# Patient Record
Sex: FEMALE | Race: WHITE | NOT HISPANIC OR LATINO | Employment: FULL TIME | URBAN - NONMETROPOLITAN AREA
[De-identification: names, ages, dates, MRNs, and addresses within clinical notes are randomized per-mention and may not be internally consistent; named-entity substitution may affect disease eponyms.]

---

## 2022-03-25 ENCOUNTER — OFFICE VISIT (OUTPATIENT)
Dept: FAMILY MEDICINE | Facility: CLINIC | Age: 72
End: 2022-03-25
Payer: MEDICARE

## 2022-03-25 VITALS
OXYGEN SATURATION: 99 % | BODY MASS INDEX: 25.52 KG/M2 | DIASTOLIC BLOOD PRESSURE: 102 MMHG | RESPIRATION RATE: 16 BRPM | TEMPERATURE: 98 F | HEIGHT: 63 IN | HEART RATE: 113 BPM | WEIGHT: 144 LBS | SYSTOLIC BLOOD PRESSURE: 158 MMHG

## 2022-03-25 DIAGNOSIS — L98.9 SKIN LESION: ICD-10-CM

## 2022-03-25 DIAGNOSIS — I48.11 LONGSTANDING PERSISTENT ATRIAL FIBRILLATION: Primary | ICD-10-CM

## 2022-03-25 DIAGNOSIS — I42.0 NONISCHEMIC CONGESTIVE CARDIOMYOPATHY: ICD-10-CM

## 2022-03-25 DIAGNOSIS — Z12.31 SCREENING MAMMOGRAM, ENCOUNTER FOR: ICD-10-CM

## 2022-03-25 DIAGNOSIS — I10 ESSENTIAL HYPERTENSION, BENIGN: ICD-10-CM

## 2022-03-25 DIAGNOSIS — Z78.0 ASYMPTOMATIC POSTMENOPAUSAL STATUS: ICD-10-CM

## 2022-03-25 LAB
ANION GAP SERPL CALCULATED.3IONS-SCNC: 9 MMOL/L (ref 7–16)
BASOPHILS # BLD AUTO: 0.07 K/UL (ref 0–0.2)
BASOPHILS NFR BLD AUTO: 0.9 % (ref 0–1)
BUN SERPL-MCNC: 11 MG/DL (ref 7–18)
BUN/CREAT SERPL: 14 (ref 6–20)
CALCIUM SERPL-MCNC: 8.7 MG/DL (ref 8.5–10.1)
CHLORIDE SERPL-SCNC: 108 MMOL/L (ref 98–107)
CO2 SERPL-SCNC: 28 MMOL/L (ref 21–32)
CREAT SERPL-MCNC: 0.76 MG/DL (ref 0.55–1.02)
DIFFERENTIAL METHOD BLD: ABNORMAL
EOSINOPHIL # BLD AUTO: 0.08 K/UL (ref 0–0.5)
EOSINOPHIL NFR BLD AUTO: 1 % (ref 1–4)
ERYTHROCYTE [DISTWIDTH] IN BLOOD BY AUTOMATED COUNT: 14.7 % (ref 11.5–14.5)
GLUCOSE SERPL-MCNC: 115 MG/DL (ref 74–106)
HCT VFR BLD AUTO: 45.3 % (ref 38–47)
HGB BLD-MCNC: 14 G/DL (ref 12–16)
IMM GRANULOCYTES # BLD AUTO: 0.02 K/UL (ref 0–0.04)
IMM GRANULOCYTES NFR BLD: 0.3 % (ref 0–0.4)
LYMPHOCYTES # BLD AUTO: 1.27 K/UL (ref 1–4.8)
LYMPHOCYTES NFR BLD AUTO: 16.1 % (ref 27–41)
MCH RBC QN AUTO: 31.9 PG (ref 27–31)
MCHC RBC AUTO-ENTMCNC: 30.9 G/DL (ref 32–36)
MCV RBC AUTO: 103.2 FL (ref 80–96)
MONOCYTES # BLD AUTO: 0.75 K/UL (ref 0–0.8)
MONOCYTES NFR BLD AUTO: 9.5 % (ref 2–6)
MPC BLD CALC-MCNC: 13.1 FL (ref 9.4–12.4)
NEUTROPHILS # BLD AUTO: 5.68 K/UL (ref 1.8–7.7)
NEUTROPHILS NFR BLD AUTO: 72.2 % (ref 53–65)
NRBC # BLD AUTO: 0 X10E3/UL
NRBC, AUTO (.00): 0 %
PLATELET # BLD AUTO: 202 K/UL (ref 150–400)
PLATELET MORPHOLOGY: ABNORMAL
POTASSIUM SERPL-SCNC: 4.7 MMOL/L (ref 3.5–5.1)
RBC # BLD AUTO: 4.39 M/UL (ref 4.2–5.4)
RBC MORPH BLD: NORMAL
SODIUM SERPL-SCNC: 140 MMOL/L (ref 136–145)
WBC # BLD AUTO: 7.87 K/UL (ref 4.5–11)

## 2022-03-25 PROCEDURE — 85610 PROTHROMBIN TIME: CPT | Mod: ,,, | Performed by: CLINICAL MEDICAL LABORATORY

## 2022-03-25 PROCEDURE — 85025 COMPLETE CBC W/AUTO DIFF WBC: CPT | Mod: ,,, | Performed by: CLINICAL MEDICAL LABORATORY

## 2022-03-25 PROCEDURE — 93010 PR ELECTROCARDIOGRAM REPORT: ICD-10-PCS | Mod: ,,, | Performed by: INTERNAL MEDICINE

## 2022-03-25 PROCEDURE — 80048 BASIC METABOLIC PNL TOTAL CA: CPT | Mod: ,,, | Performed by: CLINICAL MEDICAL LABORATORY

## 2022-03-25 PROCEDURE — 85610 PROTIME-INR: ICD-10-PCS | Mod: ,,, | Performed by: CLINICAL MEDICAL LABORATORY

## 2022-03-25 PROCEDURE — 85025 CBC WITH DIFFERENTIAL: ICD-10-PCS | Mod: ,,, | Performed by: CLINICAL MEDICAL LABORATORY

## 2022-03-25 PROCEDURE — 93010 ELECTROCARDIOGRAM REPORT: CPT | Mod: ,,, | Performed by: INTERNAL MEDICINE

## 2022-03-25 PROCEDURE — 80048 BASIC METABOLIC PANEL: ICD-10-PCS | Mod: ,,, | Performed by: CLINICAL MEDICAL LABORATORY

## 2022-03-25 PROCEDURE — 99204 OFFICE O/P NEW MOD 45 MIN: CPT | Mod: ,,, | Performed by: INTERNAL MEDICINE

## 2022-03-25 PROCEDURE — 99204 PR OFFICE/OUTPT VISIT, NEW, LEVL IV, 45-59 MIN: ICD-10-PCS | Mod: ,,, | Performed by: INTERNAL MEDICINE

## 2022-03-25 PROCEDURE — 93005 ELECTROCARDIOGRAM TRACING: CPT | Mod: RHCUB | Performed by: INTERNAL MEDICINE

## 2022-03-25 RX ORDER — WARFARIN 7.5 MG/1
7.5 TABLET ORAL DAILY
COMMUNITY
Start: 2022-03-17 | End: 2023-11-17

## 2022-03-25 NOTE — PROGRESS NOTES
New Clinic Note    Patient Name:  Khalida Leos is a 71 y.o. female     Chief Complaint:    Chief Complaint   Patient presents with    Establish Care     Patient is here to establish PCP. She does not have a former PCP. She sees Dr. Urszula Whitman and is on coumadin for mechanical valve replacement. That was performed approximately 3 years ago.     Did not bring meds    Skin Problem     She has an area of discoloration to the skin of her left upper arm. She states it's been this way for ten years. Sometimes it itches. She's never seen a dermatologist.         Subjective  HPI         Current Outpatient Medications:     warfarin (COUMADIN) 7.5 MG tablet, Take 7.5 mg by mouth Daily., Disp: , Rfl:    Past Medical History:   Diagnosis Date    History of mitral valve replacement       Past Surgical History:   Procedure Laterality Date    MITRAL VALVE REPLACEMENT        Family History   Problem Relation Age of Onset    Heart disease Mother     Cancer Father       Social History     Tobacco Use    Smoking status: Current Every Day Smoker     Types: Cigarettes    Smokeless tobacco: Never Used   Substance Use Topics    Alcohol use: Never        Review of Systems   Constitutional: Negative for fatigue and fever.   HENT: Negative for nasal congestion, rhinorrhea and sore throat.    Eyes: Negative for visual disturbance.   Respiratory: Negative for cough, chest tightness, shortness of breath and wheezing.    Cardiovascular: Negative for chest pain, palpitations and leg swelling.   Gastrointestinal: Negative for abdominal pain, blood in stool, nausea and vomiting.   Genitourinary: Negative for dysuria and hematuria.   Musculoskeletal: Negative for back pain and neck pain.   Integumentary:  Negative for rash and mole/lesion.   Neurological: Negative for dizziness, vertigo, headaches and memory loss.   Hematological: Negative for adenopathy.   Psychiatric/Behavioral: Negative for confusion. The patient is not  "nervous/anxious.         Objective:  BP (!) 158/102 (BP Location: Left arm, Patient Position: Sitting)   Pulse (!) 113   Temp 97.8 °F (36.6 °C) (Temporal)   Resp 16   Ht 5' 3" (1.6 m)   Wt 65.3 kg (144 lb)   SpO2 99%   BMI 25.51 kg/m²      Physical Exam  Constitutional:       Appearance: Normal appearance.   HENT:      Head: Normocephalic and atraumatic.      Right Ear: External ear normal.      Left Ear: External ear normal.      Nose: Nose normal.   Eyes:      Extraocular Movements: Extraocular movements intact.      Conjunctiva/sclera: Conjunctivae normal.      Pupils: Pupils are equal, round, and reactive to light.   Cardiovascular:      Rate and Rhythm: Normal rate. Rhythm irregular.      Pulses: Normal pulses.      Heart sounds: Normal heart sounds. No murmur heard.    No friction rub. No gallop.   Pulmonary:      Effort: Pulmonary effort is normal.      Breath sounds: No wheezing, rhonchi or rales.   Abdominal:      General: Bowel sounds are normal.      Palpations: There is no mass.      Tenderness: There is no abdominal tenderness.   Musculoskeletal:         General: No swelling.      Cervical back: Normal range of motion and neck supple.   Skin:     General: Skin is warm.      Findings: Lesion (3 cm lesion to left upper outer arm) present. No rash.   Neurological:      General: No focal deficit present.      Mental Status: She is alert and oriented to person, place, and time.   Psychiatric:         Mood and Affect: Mood normal.          Assessment and Plan    Longstanding persistent atrial fibrillation  -     CBC Auto Differential; Future; Expected date: 03/25/2022  -     Protime-INR; Future; Expected date: 03/25/2022  -     POCT EKG 12-LEAD (NOT FOR [facility] USE)    Essential hypertension, benign  -     Basic Metabolic Panel; Future; Expected date: 03/25/2022    Nonischemic congestive cardiomyopathy    Screening mammogram, encounter for  -     Mammo Digital Screening Bilat; Future; Expected date: " "03/25/2022    Asymptomatic postmenopausal status  -     DXA Bone Density Spine And Hip; Future; Expected date: 03/25/2022    Skin lesion  -     Ambulatory referral/consult to Dermatology; Future; Expected date: 04/01/2022         Problem List Items Addressed This Visit        Cardiac/Vascular    Nonischemic congestive cardiomyopathy    Essential hypertension, benign    Relevant Orders    Basic Metabolic Panel    Longstanding persistent atrial fibrillation - Primary    Relevant Orders    CBC Auto Differential    Protime-INR    POCT EKG 12-LEAD (NOT FOR [facility] USE)      Other Visit Diagnoses     Screening mammogram, encounter for        Relevant Orders    Mammo Digital Screening Bilat    Asymptomatic postmenopausal status        Relevant Orders    DXA Bone Density Spine And Hip    Skin lesion        Relevant Orders    Ambulatory referral/consult to Dermatology       1-EKG shows afib with occassional PVC-get [facility] records, check INR  2-HTN and CHF per old chart-did not bring meds and states she does not take "heart pill" every day, only if she feels as if she needs it, stress the importance of taking this daily  3-MMG/DEXA  4-refer to Derm for skin lesion to left upper arm ?large SK?  Discuss c-scope-never had one, will bring this up again in the future.  We will help manage INR on her    Follow up in about 6 months (around 9/25/2022).     "

## 2022-03-28 LAB
INR BLD: 2.71 (ref 0.9–1.1)
PROTHROMBIN TIME: 28.1 SECONDS (ref 11.7–14.7)

## 2022-04-22 ENCOUNTER — OFFICE VISIT (OUTPATIENT)
Dept: FAMILY MEDICINE | Facility: CLINIC | Age: 72
End: 2022-04-22
Payer: MEDICARE

## 2022-04-22 VITALS
HEART RATE: 128 BPM | BODY MASS INDEX: 24.16 KG/M2 | OXYGEN SATURATION: 98 % | WEIGHT: 136.38 LBS | HEIGHT: 63 IN | TEMPERATURE: 98 F | RESPIRATION RATE: 16 BRPM | DIASTOLIC BLOOD PRESSURE: 106 MMHG | SYSTOLIC BLOOD PRESSURE: 190 MMHG

## 2022-04-22 DIAGNOSIS — Z79.01 LONG TERM CURRENT USE OF ANTICOAGULANT: ICD-10-CM

## 2022-04-22 DIAGNOSIS — M79.601 PAIN IN BOTH UPPER EXTREMITIES: Primary | ICD-10-CM

## 2022-04-22 DIAGNOSIS — M79.602 PAIN IN BOTH UPPER EXTREMITIES: Primary | ICD-10-CM

## 2022-04-22 DIAGNOSIS — Z91.148 NONCOMPLIANCE WITH MEDICATION REGIMEN: ICD-10-CM

## 2022-04-22 DIAGNOSIS — I48.91 ATRIAL FIBRILLATION, UNSPECIFIED TYPE: ICD-10-CM

## 2022-04-22 PROBLEM — I10 ESSENTIAL HYPERTENSION, BENIGN: Chronic | Status: ACTIVE | Noted: 2022-03-25

## 2022-04-22 PROBLEM — I42.0 NONISCHEMIC CONGESTIVE CARDIOMYOPATHY: Chronic | Status: ACTIVE | Noted: 2022-03-25

## 2022-04-22 PROBLEM — I48.11 LONGSTANDING PERSISTENT ATRIAL FIBRILLATION: Chronic | Status: ACTIVE | Noted: 2022-03-25

## 2022-04-22 LAB
CTP QC/QA: YES
INR POC: 1.2 (ref 0–3.3)
PT, POC: 14.8 (ref 12–14.7)

## 2022-04-22 PROCEDURE — 96372 PR INJECTION,THERAP/PROPH/DIAG2ST, IM OR SUBCUT: ICD-10-PCS | Mod: ,,, | Performed by: NURSE PRACTITIONER

## 2022-04-22 PROCEDURE — 96372 THER/PROPH/DIAG INJ SC/IM: CPT | Mod: ,,, | Performed by: NURSE PRACTITIONER

## 2022-04-22 PROCEDURE — 99214 OFFICE O/P EST MOD 30 MIN: CPT | Mod: ,,, | Performed by: NURSE PRACTITIONER

## 2022-04-22 PROCEDURE — 85610 PROTHROMBIN TIME: CPT | Mod: RHCUB | Performed by: NURSE PRACTITIONER

## 2022-04-22 PROCEDURE — 99214 PR OFFICE/OUTPT VISIT, EST, LEVL IV, 30-39 MIN: ICD-10-PCS | Mod: ,,, | Performed by: NURSE PRACTITIONER

## 2022-04-22 RX ORDER — KETOROLAC TROMETHAMINE 30 MG/ML
30 INJECTION, SOLUTION INTRAMUSCULAR; INTRAVENOUS
Status: COMPLETED | OUTPATIENT
Start: 2022-04-22 | End: 2022-04-22

## 2022-04-22 RX ADMIN — KETOROLAC TROMETHAMINE 30 MG: 30 INJECTION, SOLUTION INTRAMUSCULAR; INTRAVENOUS at 03:04

## 2022-04-22 NOTE — PROGRESS NOTES
"   Marimar Diaz DNP, PHILIP    [facility]  [address] [place] [address] [place] [address]     PATIENT NAME: Khalida Leos  : 1950  DATE: 22  MRN: [id number]      Billing Provider: Marimar Diaz DNP, FNP  Level of Service:   Patient PCP Information     Provider PCP Type    Anjel Kasper MD General          Reason for Visit / Chief Complaint: Shoulder Pain (Right/), Arm Pain (right), Chest Pain (Soreness on right side of chest), Neck Pain (Right side; patient stated that she awakened Monday morning with right arm pain and now the pain has extended up into her shoulder, neck and chest.), and Shortness of Breath       Update PCP  Update Chief Complaint         History of Present Illness / Problem Focused Workflow     Khalida Leos presents to the clinic with Shoulder Pain (Right/), Arm Pain (right), Chest Pain (Soreness on right side of chest), Neck Pain (Right side; patient stated that she awakened Monday morning with right arm pain and now the pain has extended up into her shoulder, neck and chest.), and Shortness of Breath     Pt c/o right arm pain and shoulder pain into neck x 4 days. Pt has not taken any over the counter meds. Pt states she takes her warfarin most of the time but she is not sure if she has taken it every day this week. Pt states she is supposed to take other medications but does not take them unless she "feels bad." Pt did not bring meds with her today.     Pt states pain is worse with movement.     Pt was supposed to see Dr. Whitman 1 day ago but did not go because of work.     Shoulder Pain   Pertinent negatives include no fever or headaches.   Arm Pain   Associated symptoms include chest pain.   Chest Pain   Associated symptoms include shortness of breath. Pertinent negatives include no abdominal pain, cough, fever, headaches, nausea or vomiting.   Neck Pain   Associated symptoms include chest pain. Pertinent negatives include no fever or headaches.   Shortness of " Breath  Associated symptoms include chest pain and neck pain. Pertinent negatives include no abdominal pain, ear pain, fever, headaches, rhinorrhea or vomiting.       Review of Systems     Review of Systems   Constitutional: Negative for fatigue and fever.   HENT: Negative for ear pain, postnasal drip, rhinorrhea and sinus pressure/congestion.    Respiratory: Positive for shortness of breath. Negative for cough.    Cardiovascular: Positive for chest pain.   Gastrointestinal: Negative for abdominal pain, diarrhea, nausea and vomiting.   Genitourinary: Negative for dysuria.   Musculoskeletal: Positive for neck pain.   Neurological: Negative for headaches.        Medical / Social / Family History     Past Medical History:   Diagnosis Date    History of mitral valve replacement        Past Surgical History:   Procedure Laterality Date    MITRAL VALVE REPLACEMENT         Social History  Ms. Khalida Leos  reports that she has been smoking cigarettes. She has never used smokeless tobacco. She reports that she does not drink alcohol.    Family History  Ms. Khalida Leos's family history includes Cancer in her father; Heart disease in her mother.    Medications and Allergies     Medications  No outpatient medications have been marked as taking for the 4/22/22 encounter (Office Visit) with Marimar Diaz DNP, FNP.     Current Facility-Administered Medications for the 4/22/22 encounter (Office Visit) with Marimar Diaz DNP, FNP   Medication Dose Route Frequency Provider Last Rate Last Admin    [COMPLETED] ketorolac injection 30 mg  30 mg Intramuscular 1 time in Clinic/HOD Marimar Diaz DNP, FNP   30 mg at 04/22/22 1503       Allergies  Review of patient's allergies indicates:  No Known Allergies    Physical Examination     Vitals:    04/22/22 1500   BP: (!) 190/106   Pulse:    Resp:    Temp:      Physical Exam  Vitals and nursing note reviewed.   Constitutional:       General: She is not in acute distress.  HENT:       Nose: Nose normal.      Mouth/Throat:      Mouth: Mucous membranes are moist.   Eyes:      Pupils: Pupils are equal, round, and reactive to light.   Cardiovascular:      Rate and Rhythm: Tachycardia present. Rhythm irregular.      Pulses: Normal pulses.      Heart sounds: Normal heart sounds. No murmur heard.     Comments: A fib with rate of 121  Pulmonary:      Effort: Pulmonary effort is normal. No respiratory distress.      Breath sounds: Normal breath sounds. No wheezing, rhonchi or rales.   Chest:      Chest wall: No tenderness.   Abdominal:      General: Bowel sounds are normal.      Palpations: Abdomen is soft.   Musculoskeletal:         General: Normal range of motion.      Cervical back: Normal range of motion and neck supple.      Right lower leg: No edema.      Left lower leg: No edema.   Skin:     General: Skin is warm and dry.   Neurological:      General: No focal deficit present.      Mental Status: She is alert and oriented to person, place, and time.          Assessment and Plan (including Health Maintenance)      Problem List  Smart Sets  Document Outside HM   :    Plan:   Recommend patient go to ED for cardiac evaluation. Pt declines.   Discussed patient with Dr. Kasper. Agrees to treat and instruct patient to take medications as prescribed and follow up with pcp in 1 week.       Health Maintenance Due   Topic Date Due    Hepatitis C Screening  Never done    Lipid Panel  Never done    COVID-19 Vaccine (1) Never done    TETANUS VACCINE  Never done    Mammogram  Never done    DEXA Scan  Never done    Colorectal Cancer Screening  Never done    Shingles Vaccine (1 of 2) Never done    Pneumococcal Vaccines (Age 65+) (2 - PCV) 03/16/2019    Influenza Vaccine (1) Never done       Problem List Items Addressed This Visit    None     Visit Diagnoses     Pain in both upper extremities    -  Primary    Relevant Medications    ketorolac injection 30 mg (Completed)    Atrial fibrillation, unspecified  type        Relevant Orders    POCT PT/INR (Completed)    Long term current use of anticoagulant        Relevant Orders    POCT PT/INR (Completed)    Noncompliance with medication regimen            Pain in both upper extremities  -     ketorolac injection 30 mg    Atrial fibrillation, unspecified type  -     POCT PT/INR    Long term current use of anticoagulant  -     POCT PT/INR    Noncompliance with medication regimen       The patient has no Health Maintenance topics of status Not Due        Future Appointments   Date Time Provider Department Center   4/28/2022  3:45 PM Anjel Kasper MD [facility]   5/24/2022  4:00 PM Allyssa Babcock MD [facility]   6/22/2022 10:00 AM [facility] MAMMO1 [facility] MAMMO [facility]   6/22/2022 10:30 AM [facility] XR4 DEXA [facility] XRAY [facility]   9/23/2022  1:00 PM Anjel Kasper MD [facility]        Follow up in about 1 week (around 4/29/2022).     Signature:  Marimar Diaz DNP, FNP  [address] [place] [address]  Phone #: [phone]  Fax #: [phone]    Date of encounter: 4/22/22    Patient Instructions   Take warfarin 15 mg tonight and tomorrow night then resume 7.5 mg nightly. Take meds as prescribed. Acetaminophen for pain. Ice to affected area. Follow up with primary care provider in 1 week. Seek medical attention at emergency department if symptoms worsen.

## 2022-04-22 NOTE — PATIENT INSTRUCTIONS
Take warfarin 15 mg tonight and tomorrow night then resume 7.5 mg nightly. Take meds as prescribed. Acetaminophen for pain. Ice to affected area. Follow up with primary care provider in 1 week. Seek medical attention at emergency department if symptoms worsen.

## 2022-04-28 ENCOUNTER — OFFICE VISIT (OUTPATIENT)
Dept: FAMILY MEDICINE | Facility: CLINIC | Age: 72
End: 2022-04-28
Payer: MEDICARE

## 2022-04-28 ENCOUNTER — HOSPITAL ENCOUNTER (OUTPATIENT)
Dept: RADIOLOGY | Facility: HOSPITAL | Age: 72
Discharge: HOME | End: 2022-04-28
Attending: INTERNAL MEDICINE
Payer: MEDICARE

## 2022-04-28 VITALS
DIASTOLIC BLOOD PRESSURE: 62 MMHG | SYSTOLIC BLOOD PRESSURE: 126 MMHG | TEMPERATURE: 97 F | BODY MASS INDEX: 23.81 KG/M2 | HEART RATE: 60 BPM | WEIGHT: 134.38 LBS | OXYGEN SATURATION: 98 % | RESPIRATION RATE: 16 BRPM

## 2022-04-28 DIAGNOSIS — M54.2 NECK PAIN, BILATERAL: ICD-10-CM

## 2022-04-28 DIAGNOSIS — I10 ESSENTIAL HYPERTENSION, BENIGN: Chronic | ICD-10-CM

## 2022-04-28 DIAGNOSIS — M54.2 NECK PAIN, BILATERAL: Primary | ICD-10-CM

## 2022-04-28 DIAGNOSIS — Z79.01 LONG TERM CURRENT USE OF ANTICOAGULANT: ICD-10-CM

## 2022-04-28 DIAGNOSIS — I48.91 ATRIAL FIBRILLATION, UNSPECIFIED TYPE: Chronic | ICD-10-CM

## 2022-04-28 LAB
CTP QC/QA: YES
INR POC: 2.3 (ref 0–3.3)
PT, POC: 28 (ref 12–14.7)

## 2022-04-28 PROCEDURE — 72040 X-RAY EXAM NECK SPINE 2-3 VW: CPT | Mod: TC

## 2022-04-28 PROCEDURE — 85610 PROTHROMBIN TIME: CPT | Mod: RHCUB | Performed by: INTERNAL MEDICINE

## 2022-04-28 PROCEDURE — 96372 THER/PROPH/DIAG INJ SC/IM: CPT | Mod: ,,, | Performed by: INTERNAL MEDICINE

## 2022-04-28 PROCEDURE — 96372 PR INJECTION,THERAP/PROPH/DIAG2ST, IM OR SUBCUT: ICD-10-PCS | Mod: ,,, | Performed by: INTERNAL MEDICINE

## 2022-04-28 PROCEDURE — 99214 OFFICE O/P EST MOD 30 MIN: CPT | Mod: ,,, | Performed by: INTERNAL MEDICINE

## 2022-04-28 PROCEDURE — 99214 PR OFFICE/OUTPT VISIT, EST, LEVL IV, 30-39 MIN: ICD-10-PCS | Mod: ,,, | Performed by: INTERNAL MEDICINE

## 2022-04-28 RX ORDER — TIZANIDINE 4 MG/1
4 TABLET ORAL EVERY 8 HOURS PRN
Qty: 30 TABLET | Refills: 5 | Status: SHIPPED | OUTPATIENT
Start: 2022-04-28 | End: 2022-05-08

## 2022-04-28 RX ORDER — DEXAMETHASONE SODIUM PHOSPHATE 4 MG/ML
4 INJECTION, SOLUTION INTRA-ARTICULAR; INTRALESIONAL; INTRAMUSCULAR; INTRAVENOUS; SOFT TISSUE
Status: COMPLETED | OUTPATIENT
Start: 2022-04-28 | End: 2022-04-28

## 2022-04-28 RX ORDER — DILTIAZEM HYDROCHLORIDE 120 MG/1
120 CAPSULE, EXTENDED RELEASE ORAL DAILY
COMMUNITY
End: 2023-11-20

## 2022-04-28 RX ORDER — LOSARTAN POTASSIUM 50 MG/1
50 TABLET ORAL DAILY
COMMUNITY
End: 2023-11-20

## 2022-04-28 RX ORDER — CARVEDILOL 6.25 MG/1
6.25 TABLET ORAL 2 TIMES DAILY WITH MEALS
COMMUNITY
End: 2023-11-20

## 2022-04-28 RX ORDER — METHYLPREDNISOLONE ACETATE 40 MG/ML
40 INJECTION, SUSPENSION INTRA-ARTICULAR; INTRALESIONAL; INTRAMUSCULAR; SOFT TISSUE
Status: COMPLETED | OUTPATIENT
Start: 2022-04-28 | End: 2022-04-28

## 2022-04-28 RX ORDER — METOPROLOL TARTRATE 50 MG/1
50 TABLET ORAL 2 TIMES DAILY
COMMUNITY
End: 2023-11-20

## 2022-04-28 RX ORDER — DEXTROMETHORPHAN HYDROBROMIDE, GUAIFENESIN 5; 100 MG/5ML; MG/5ML
650 LIQUID ORAL EVERY 8 HOURS
COMMUNITY
End: 2023-11-20

## 2022-04-28 RX ORDER — CHLORTHALIDONE 25 MG/1
25 TABLET ORAL DAILY
COMMUNITY
End: 2023-11-20

## 2022-04-28 RX ADMIN — METHYLPREDNISOLONE ACETATE 40 MG: 40 INJECTION, SUSPENSION INTRA-ARTICULAR; INTRALESIONAL; INTRAMUSCULAR; SOFT TISSUE at 04:04

## 2022-04-28 RX ADMIN — DEXAMETHASONE SODIUM PHOSPHATE 4 MG: 4 INJECTION, SOLUTION INTRA-ARTICULAR; INTRALESIONAL; INTRAMUSCULAR; INTRAVENOUS; SOFT TISSUE at 04:04

## 2022-04-28 NOTE — PROGRESS NOTES
"New Clinic Note    Patient Name:  Khalida Leos is a 71 y.o. female     Chief Complaint:    Chief Complaint   Patient presents with    Follow-up     Patient is here to follow up after seeing Marimar Diaz NP on 4/22 for chest pain, right arm pain, and shortness of breath. She states she feels better but her arms feel heavy and painful in the mornings. She was advised to take warfarin 15 mg 4/22 and 4/23 then resume 7.5 mg nightly and f/u with PCP in 1 week. She still has dyspnea but states that is chronic. Denies further chest pain. She didn't bring all meds last visit bc she hadn't been taking them. She brought meds today. She's resumed them all.     Medication Problem     The only medicine on her list that she has been taking regularly was the Warfarin. ALL other medicines on her list are medicines that Dr. Whitman had her on but she had not been taking "for a long time". She resumed them all last week when she had the episode of chest pain and shortness of breath. She says it has been several months since she last saw Dr. Whitman.         Subjective  HPI         Current Outpatient Medications:     acetaminophen (TYLENOL) 650 MG TbSR, Take 650 mg by mouth every 8 (eight) hours., Disp: , Rfl:     carvediloL (COREG) 6.25 MG tablet, Take 6.25 mg by mouth 2 (two) times daily with meals., Disp: , Rfl:     chlorthalidone (HYGROTEN) 25 MG Tab, Take 25 mg by mouth once daily., Disp: , Rfl:     diltiaZEM HCl (TIAZAC) 120 mg 24 hr capsule, Take 120 mg by mouth once daily., Disp: , Rfl:     losartan (COZAAR) 50 MG tablet, Take 50 mg by mouth once daily., Disp: , Rfl:     metoprolol tartrate (LOPRESSOR) 50 MG tablet, Take 50 mg by mouth 2 (two) times daily., Disp: , Rfl:     warfarin (COUMADIN) 7.5 MG tablet, Take 7.5 mg by mouth Daily., Disp: , Rfl:     tiZANidine (ZANAFLEX) 4 MG tablet, Take 1 tablet (4 mg total) by mouth every 8 (eight) hours as needed., Disp: 30 tablet, Rfl: 5  No current facility-administered " medications for this visit.   Past Medical History:   Diagnosis Date    History of mitral valve replacement       Past Surgical History:   Procedure Laterality Date    MITRAL VALVE REPLACEMENT        Family History   Problem Relation Age of Onset    Heart disease Mother     Cancer Father       Social History     Tobacco Use    Smoking status: Current Every Day Smoker     Types: Cigarettes    Smokeless tobacco: Never Used   Substance Use Topics    Alcohol use: Never        Review of Systems   Constitutional: Negative for fatigue and fever.   HENT: Negative for nasal congestion, rhinorrhea and sore throat.    Eyes: Negative for visual disturbance.   Respiratory: Negative for cough, chest tightness, shortness of breath and wheezing.    Cardiovascular: Negative for chest pain, palpitations and leg swelling.   Gastrointestinal: Negative for abdominal pain, blood in stool, nausea and vomiting.   Genitourinary: Negative for dysuria and hematuria.   Musculoskeletal: Positive for neck pain. Negative for back pain.   Integumentary:  Negative for rash and mole/lesion.   Neurological: Negative for dizziness, vertigo, headaches and memory loss.   Hematological: Negative for adenopathy.   Psychiatric/Behavioral: Negative for confusion. The patient is not nervous/anxious.         Objective:  /62 (BP Location: Left arm, Patient Position: Sitting)   Pulse 60   Temp 97 °F (36.1 °C) (Temporal)   Resp 16   Wt 61 kg (134 lb 6.4 oz)   SpO2 98%   BMI 23.81 kg/m²      Physical Exam  Constitutional:       Appearance: Normal appearance.   HENT:      Head: Normocephalic and atraumatic.   Eyes:      Pupils: Pupils are equal, round, and reactive to light.   Neck:      Comments: Pain down right arm with movement of neck   Cardiovascular:      Rate and Rhythm: Normal rate.      Pulses: Normal pulses.   Pulmonary:      Effort: Pulmonary effort is normal.   Abdominal:      General: Abdomen is flat.   Skin:     General: Skin is  warm.   Neurological:      Mental Status: She is alert and oriented to person, place, and time.          Assessment and Plan    Neck pain, bilateral  -     X-Ray Cervical Spine 2 or 3 Views; Future; Expected date: 04/28/2022  -     dexamethasone injection 4 mg  -     methylPREDNISolone acetate injection 40 mg  -     tiZANidine (ZANAFLEX) 4 MG tablet; Take 1 tablet (4 mg total) by mouth every 8 (eight) hours as needed.  Dispense: 30 tablet; Refill: 5    Atrial fibrillation, unspecified type  -     POCT PT/INR    Long term current use of anticoagulant    Essential hypertension, benign         Problem List Items Addressed This Visit        Cardiac/Vascular    Essential hypertension, benign (Chronic)    Atrial fibrillation    Relevant Orders    POCT PT/INR (Completed)      Other Visit Diagnoses     Neck pain, bilateral    -  Primary    Relevant Medications    dexamethasone injection 4 mg (Completed)    methylPREDNISolone acetate injection 40 mg (Completed)    tiZANidine (ZANAFLEX) 4 MG tablet    Other Relevant Orders    X-Ray Cervical Spine 2 or 3 Views (Completed)    Long term current use of anticoagulant             1-bp better  2-A fib stable-recheck INR 1 months  3-neck pain with DJD on xray-4/40 and zanaflex, if no better will need MRI  Follow up if symptoms worsen or fail to improve.

## 2022-05-10 DIAGNOSIS — Z71.89 COMPLEX CARE COORDINATION: ICD-10-CM

## 2022-12-09 DIAGNOSIS — Z71.89 COMPLEX CARE COORDINATION: ICD-10-CM

## 2023-03-15 ENCOUNTER — TELEPHONE (OUTPATIENT)
Dept: ADMINISTRATIVE | Facility: HOSPITAL | Age: 73
End: 2023-03-15

## 2023-03-15 NOTE — TELEPHONE ENCOUNTER
Attempted to contact pt to schedule Annual Visit. No answer, LVM for pt to contact the clinic to schedule. [phone]      LOV: 4.28.22    PT NEEDS ANNUAL VISIT

## 2023-05-22 ENCOUNTER — OFFICE VISIT (OUTPATIENT)
Dept: SURGERY | Facility: CLINIC | Age: 73
End: 2023-05-22
Payer: MEDICARE

## 2023-05-22 DIAGNOSIS — M79.89 LEG SWELLING: ICD-10-CM

## 2023-05-22 DIAGNOSIS — I73.9 PVD (PERIPHERAL VASCULAR DISEASE): Primary | ICD-10-CM

## 2023-05-22 DIAGNOSIS — M79.605 PAIN IN LEFT LEG: ICD-10-CM

## 2023-05-22 PROCEDURE — 99203 OFFICE O/P NEW LOW 30 MIN: CPT | Mod: S$PBB,,, | Performed by: NURSE PRACTITIONER

## 2023-05-22 PROCEDURE — 99203 PR OFFICE/OUTPT VISIT, NEW, LEVL III, 30-44 MIN: ICD-10-PCS | Mod: S$PBB,,, | Performed by: NURSE PRACTITIONER

## 2023-05-22 PROCEDURE — 99213 OFFICE O/P EST LOW 20 MIN: CPT | Mod: PBBFAC | Performed by: NURSE PRACTITIONER

## 2023-05-22 RX ORDER — COLLAGENASE SANTYL 250 [ARB'U]/G
OINTMENT TOPICAL DAILY
Qty: 30 G | Refills: 0 | Status: SHIPPED | OUTPATIENT
Start: 2023-05-22 | End: 2023-11-20

## 2023-05-22 NOTE — PROGRESS NOTES
Subjective:       Patient ID: Khalida Leos is a 72 y.o. female.    Chief Complaint: Consult (Vascular eval arterial and venous of lower extremities )    Referred from Dr. Ruiz to evaluate for PVD approximally 1 month ago after a fall has a left lower extremity ulcer that is not healing has been on oral doxycycline and Bactroban ointment  Patient is on Coumadin for valve replacement    family history includes Cancer in her father; Heart disease in her mother.  Past Medical History:   Diagnosis Date    History of mitral valve replacement       Past Surgical History:   Procedure Laterality Date    MITRAL VALVE REPLACEMENT         reports that she has been smoking cigarettes. She has never used smokeless tobacco. She reports that she does not drink alcohol.   HPI  Review of Systems   Integumentary:  Positive for wound.       Objective:      There were no vitals taken for this visit.   Physical Exam  Vitals and nursing note reviewed.   Constitutional:       Appearance: Normal appearance.   HENT:      Head: Normocephalic.      Mouth/Throat:      Mouth: Mucous membranes are moist.   Eyes:      Conjunctiva/sclera: Conjunctivae normal.   Cardiovascular:      Rate and Rhythm: Normal rate and regular rhythm.   Pulmonary:      Effort: Pulmonary effort is normal.   Abdominal:      Palpations: Abdomen is soft.   Musculoskeletal:      Left lower leg: Edema present.      Comments: 1 to 2+ left lower extremity edema   Skin:     General: Skin is warm and dry.      Comments:  left lower extremity wound approximally 2-3 cm with light yellow slough   Neurological:      Mental Status: She is alert and oriented to person, place, and time.   Psychiatric:         Mood and Affect: Mood normal.         Assessment:       1. PVD (peripheral vascular disease)    2. Leg swelling    3. Pain in left leg      Nonhealing left lower extremity ulcer approximally 2-3 cm  Plan:         Left lower extremity venous duplex to rule out DVT  ABIs  bilateral lower extremity to evaluate for PVD  E kylee Nuno debriding ointment  Dr. Ruiz is considering debridement and biopsy to rule out vasculitis after PVD evaluation

## 2023-05-24 ENCOUNTER — HOSPITAL ENCOUNTER (OUTPATIENT)
Dept: RADIOLOGY | Facility: HOSPITAL | Age: 73
Discharge: HOME | End: 2023-05-24
Attending: NURSE PRACTITIONER
Payer: MEDICARE

## 2023-05-24 ENCOUNTER — OFFICE VISIT (OUTPATIENT)
Dept: SURGERY | Facility: CLINIC | Age: 73
End: 2023-05-24
Payer: MEDICARE

## 2023-05-24 VITALS — HEART RATE: 68 BPM | TEMPERATURE: 98 F | OXYGEN SATURATION: 99 %

## 2023-05-24 DIAGNOSIS — I73.9 PERIPHERAL VASCULAR DISEASE, UNSPECIFIED: Primary | ICD-10-CM

## 2023-05-24 DIAGNOSIS — F17.200 SMOKER: ICD-10-CM

## 2023-05-24 DIAGNOSIS — M79.605 PAIN IN LEFT LEG: ICD-10-CM

## 2023-05-24 DIAGNOSIS — I73.9 PVD (PERIPHERAL VASCULAR DISEASE): ICD-10-CM

## 2023-05-24 DIAGNOSIS — M79.89 LEG SWELLING: ICD-10-CM

## 2023-05-24 PROCEDURE — 93925 LOWER EXTREMITY STUDY: CPT | Mod: TC

## 2023-05-24 PROCEDURE — 99214 PR OFFICE/OUTPT VISIT, EST, LEVL IV, 30-39 MIN: ICD-10-PCS | Mod: S$PBB,,, | Performed by: NURSE PRACTITIONER

## 2023-05-24 PROCEDURE — 93922 UPR/L XTREMITY ART 2 LEVELS: CPT | Mod: 26,,, | Performed by: SURGERY

## 2023-05-24 PROCEDURE — 93925 LOWER EXTREMITY STUDY: CPT | Mod: 26,,, | Performed by: SURGERY

## 2023-05-24 PROCEDURE — 93971 US LOWER EXTREMITY VEINS LEFT: ICD-10-PCS | Mod: 26,LT,, | Performed by: SURGERY

## 2023-05-24 PROCEDURE — 93971 EXTREMITY STUDY: CPT | Mod: TC,LT

## 2023-05-24 PROCEDURE — 99214 OFFICE O/P EST MOD 30 MIN: CPT | Mod: PBBFAC,25 | Performed by: NURSE PRACTITIONER

## 2023-05-24 PROCEDURE — 99214 OFFICE O/P EST MOD 30 MIN: CPT | Mod: S$PBB,,, | Performed by: NURSE PRACTITIONER

## 2023-05-24 PROCEDURE — 93925 US ARTERIAL LOWER EXTREMITY BILAT WITH ABI (XPD): ICD-10-PCS | Mod: 26,,, | Performed by: SURGERY

## 2023-05-24 PROCEDURE — 93922 US ARTERIAL LOWER EXTREMITY BILAT WITH ABI (XPD): ICD-10-PCS | Mod: 26,,, | Performed by: SURGERY

## 2023-05-24 PROCEDURE — 93971 EXTREMITY STUDY: CPT | Mod: 26,LT,, | Performed by: SURGERY

## 2023-05-24 RX ORDER — COLLAGENASE SANTYL 250 [ARB'U]/G
OINTMENT TOPICAL DAILY
Qty: 30 G | Refills: 0 | Status: SHIPPED | OUTPATIENT
Start: 2023-05-24 | End: 2023-11-20

## 2023-05-24 NOTE — PROGRESS NOTES
Subjective:       Patient ID: Khalida Leos is a 72 y.o. female.    Chief Complaint: Follow-up (Test results/-Stated that her lower left leg in pain.)    Referred from Dr. Ruiz to evaluate for PVD approximally 1 month ago after a fall has a left lower extremity ulcer that is not healing has been on oral doxycycline and Bactroban ointment  Patient is on Coumadin for valve replacement/smoker/nondiabetic    05/24/2023 here today follow-up venous ultrasound results negative for DVT ABIs limited study due to painful ulcer left lower extremity /left calf 0.88 right DP BELKYS 0.88  Decreased  right TBI 0.55 left TBI 0.59/right popliteal velocity doubled to 70 asymptomatic right lower extremity (denies claudication right leg without wound to RIGHT leg)    family history includes Cancer in her father; Heart disease in her mother.  Past Medical History:   Diagnosis Date    History of mitral valve replacement       Past Surgical History:   Procedure Laterality Date    MITRAL VALVE REPLACEMENT         reports that she has been smoking cigarettes. She has never used smokeless tobacco. She reports that she does not drink alcohol.   HPI  Review of Systems   Integumentary:  Positive for wound.       Objective:      Pulse 68   Temp 97.8 °F (36.6 °C)   SpO2 99%    Physical Exam  Vitals and nursing note reviewed.   Constitutional:       Appearance: Normal appearance.   HENT:      Head: Normocephalic.      Mouth/Throat:      Mouth: Mucous membranes are moist.   Eyes:      Conjunctiva/sclera: Conjunctivae normal.   Cardiovascular:      Rate and Rhythm: Normal rate and regular rhythm.   Pulmonary:      Effort: Pulmonary effort is normal.   Abdominal:      Palpations: Abdomen is soft.   Musculoskeletal:      Left lower leg: Edema present.      Comments: 1 to 2+ left lower extremity edema   Skin:     General: Skin is warm and dry.      Comments:  left lower extremity wound approximally 2-3 cm with light yellow slough   Neurological:       Mental Status: She is alert and oriented to person, place, and time.   Psychiatric:         Mood and Affect: Mood normal.         Assessment:       1. Peripheral vascular disease, unspecified    2. Smoker      Previous mitral valve replacement on Coumadin nondiabetic  Painful Nonhealing left lower extremity ulcer approximally 2-3 cm  Negative DVT on the left, limited BELKYS study due to painful left lower extremity with ulcer   Plan:     CTA further evaluation for peripheral vascular disease and follow-up with Dr. Zaldivar    Continue doxycycline antibiotic prescribed by Dr. Ruiz has not gotten Vincenzoyl debriding ointment yet [facility] [place]  [facility]  Dr. Ruiz is considering debridement and biopsy to rule out vasculitis after PVD evaluation

## 2023-06-29 ENCOUNTER — OFFICE VISIT (OUTPATIENT)
Dept: SURGERY | Facility: CLINIC | Age: 73
End: 2023-06-29
Payer: MEDICARE

## 2023-06-29 ENCOUNTER — HOSPITAL ENCOUNTER (OUTPATIENT)
Dept: RADIOLOGY | Facility: HOSPITAL | Age: 73
Discharge: HOME | End: 2023-06-29
Attending: NURSE PRACTITIONER
Payer: MEDICARE

## 2023-06-29 VITALS
OXYGEN SATURATION: 99 % | TEMPERATURE: 99 F | HEART RATE: 95 BPM | WEIGHT: 124 LBS | HEIGHT: 63 IN | BODY MASS INDEX: 21.97 KG/M2

## 2023-06-29 DIAGNOSIS — I73.9 PERIPHERAL VASCULAR DISEASE, UNSPECIFIED: ICD-10-CM

## 2023-06-29 DIAGNOSIS — I73.9 PVD (PERIPHERAL VASCULAR DISEASE): Primary | ICD-10-CM

## 2023-06-29 DIAGNOSIS — Z95.2 H/O MITRAL VALVE REPLACEMENT: ICD-10-CM

## 2023-06-29 PROCEDURE — 99215 OFFICE O/P EST HI 40 MIN: CPT | Mod: PBBFAC,25 | Performed by: SURGERY

## 2023-06-29 PROCEDURE — 25500020 PHARM REV CODE 255: Performed by: NURSE PRACTITIONER

## 2023-06-29 PROCEDURE — 99204 PR OFFICE/OUTPT VISIT, NEW, LEVL IV, 45-59 MIN: ICD-10-PCS | Mod: S$PBB,,, | Performed by: SURGERY

## 2023-06-29 PROCEDURE — 99204 OFFICE O/P NEW MOD 45 MIN: CPT | Mod: S$PBB,,, | Performed by: SURGERY

## 2023-06-29 PROCEDURE — 75635 CT ANGIO ABDOMINAL ARTERIES: CPT | Mod: TC

## 2023-06-29 PROCEDURE — 75635 CT ANGIO ABDOMINAL ARTERIES: CPT | Mod: 26,,, | Performed by: RADIOLOGY

## 2023-06-29 PROCEDURE — 75635 CTA RUNOFF ABD PEL BILAT LOWER EXT: ICD-10-PCS | Mod: 26,,, | Performed by: RADIOLOGY

## 2023-06-29 RX ORDER — SODIUM CHLORIDE 9 MG/ML
INJECTION, SOLUTION INTRAVENOUS CONTINUOUS
Status: CANCELLED | OUTPATIENT
Start: 2023-06-29

## 2023-06-29 RX ADMIN — IOPAMIDOL 125 ML: 755 INJECTION, SOLUTION INTRAVENOUS at 11:06

## 2023-06-29 NOTE — PROGRESS NOTES
"Subjective:       Patient ID: Khalida Leos is a 72 y.o. female.    Chief Complaint: Follow-up (CTA)  Status patient follow-up.  Peripheral vascular disease.  I made obtain a left due to the ulceration on the anterior shin she does have some typical venous stasis changes her CT angio shows sequential probably 80+ % stenoses her toe brachial index is decreased on left.  Does have mechanical mitral valve  family history includes Cancer in her father; Heart disease in her mother.  Past Medical History:   Diagnosis Date    History of mitral valve replacement     Hypertension       Past Surgical History:   Procedure Laterality Date    MITRAL VALVE REPLACEMENT         reports that she has been smoking cigarettes. She has never used smokeless tobacco. She reports that she does not drink alcohol.   HPI  Review of Systems      Objective:      Pulse 95   Temp 98.5 °F (36.9 °C) (Oral)   Ht 5' 3" (1.6 m)   Wt 56.2 kg (124 lb)   SpO2 99%   BMI 21.97 kg/m²    Physical Exam  Constitutional:       Appearance: Normal appearance.   HENT:      Head: Normocephalic and atraumatic.   Cardiovascular:      Rate and Rhythm: Normal rate.      Heart sounds: Normal heart sounds.   Pulmonary:      Effort: Pulmonary effort is normal.   Abdominal:      General: Abdomen is flat.      Palpations: Abdomen is soft.   Skin:     General: Skin is warm and dry.      Capillary Refill: Capillary refill takes less than 2 seconds.      Comments: Anterior shin 3 x 3 cm clean based stage II skin ulceration no cellulitis or infection   Neurological:      General: No focal deficit present.      Mental Status: She is alert.   Psychiatric:         Mood and Affect: Mood normal.         Behavior: Behavior normal.         Thought Content: Thought content normal.         Judgment: Judgment normal.       Assessment:       1. PVD (peripheral vascular disease)    2. H/O mitral valve replacement        Plan:       Admit heparin drip get off her Coumadin angio he is " lesions appear amenable the percutaneous intervention

## 2023-06-29 NOTE — PATIENT INSTRUCTIONS
[facility]      Your surgery is scheduled for 7/7 at [facility] on the ground floor of the Ambulatory building. You should arrive at 6:00 at the Ambulatory Care Center located at [address].                                                                                                                                                                                                                                                                                                                                                                                                                                                                                                Day of Surgery Instructions      Bring a list of all your medications with you the day of your surgery. You can also give the list to your doctor or nurse during your final clinic appointment before surgery.    Stop taking all herbal medications 14 days prior to surgery.  Stop taking blood thinners on Tuesday July 4th. Start lovenox July 4th. No Lovenox morning of surgery.  Stop drinking alcoholic beverages for 24 hours before surgery. Do not drink alcohol for 24 hours after surgery.  Eat a light supper on the night before your surgery.  Do not eat any solid foods or drink any liquids after 12:00 AM (midnight). This includes gum, hard candy, mints, and chewing tobacco.  Medications: Take any medications specified with a small sip of water the morning of your surgery.  Brush your teeth: You may brush your teeth and rinse your mouth. Do not swallow any water or toothpaste.  Clothing: A button front shirt and loose-fitting clothes are the most comfortable before and after surgery. We also recommend low-heeled shoes.  Hair: Avoid buns, ponytails, or hairpieces at the back of the head. Remove or avoid any clips, pins or bands that bind hair. Do not use hairspray. Before going to surgery, you will need to remove any wigs or  hairpieces.  We will cover your hair during surgery. Your privacy regarding personal appearance will be respected.  Fingernails: Please be sure to remove all nail polish before you arrive for surgery. We understand that tips, wraps, gels, etc., are expensive; however, we ask these products to be removed from at least one finger on each hand. Your fingertips are used to accurately monitor your oxygen level during surgery by a device called an oximeter.  Glasses and Contact Lenses: Wear glasses when possible. If contact lenses must be worn, bring a lens case and solution. If glasses are worn, bring a case for them.  Hearing Aids: If you rely on a hearing aid, wear it to the hospital on the day of surgery. This will ensure you can hear and understand everything we need to communicate with you.  Valuables: Jewelry, including body piercings, Dentures, money, and credit cards should be left at home. [facility] not responsible for valuables that are not secured in our surgery center.  Makeup, Perfume, Creams, Lotions and Deodorants: Do not use any of these products on the day of surgery. Remove false eyelashes prior to surgery.  Implanted Medical Devices: If you have an implanted device, such as a pacemaker or AICD, bring the device information card (if you have it) with you.  Medical Equipment: If you have been fitted for a brace to wear after surgery or you have been given crutches, bring those with you to the surgery center.  Shower: Take a shower with Hibiclens® (chlorhexidine) (available over the counter). This reduces the chance of infection. PLEASE USE CHLORHEXIDINE WASH THE NIGHT BEFORE SURGERY AND THE MORNING OF SURGERY.      If you are diabetic      Follow the diabetic medicine instructions you received during your pre-operative visit.  DO NOT take your insulin or diabetic medications the morning of surgery.  When you arrive at the surgical center, be sure to tell the nurse you are diabetic.            Other  Items to bring with you and know      Insurance card  Identification card such as 's license, passport, or other picture ID  Copy of your advance directives  List of medications and allergies, if not already provided  Name and phone number of person to contact if your condition changes significantly. YOU CANNOT DRIVE YOURSELF HOME FROM THE HOSPITAL THE DAY OF SURGERY.  PLEASE UNDERSTAND THAT OUR OFFICE DOES NOT GIVE PATHOLOGY RESULTS OR TEST RESULTS OVER THE PHONE. THIS WILL BE DISCUSSED WITH YOU ON YOUR FOLLOW UP APPOINTMENT.        Medication instructions:  You may take blood pressure medication with a small drink of water the morning of surgery.    The following blood sugar medications have to be stopped prior to surgery:    Metformin, Glucovance, Metaglip, Fortamet, Glucophage, Riomet, Avandamet, Glimepiride    IF YOU ARE ON ANY OF THESE BLOOD THINNERS, MAKE SURE YOUR PHYSICIAN IS AWARE.  Eliquis/Apixaban         Wafarin/Coumadin,Jantoven  Xarelto/Rivaroxaban   Pletal/Cilostazol  Plavix/Clopidogrel                                                              Pradaxa/Dibigatran        Alcohol and Surgery  We want to help you prepare for and recover from surgery as quickly and safely as possible. Be open and honest with your provider about how many drinks you have per day. Excessive alcohol use is defined as drinking more than three drinks per day. It can affect the outcome of your surgery. Binge drinking (consuming large amounts of alcohol infrequently, such as on weekends) can also affect the outcome of your surgery.  Alcohol withdrawal  If you drink more than three drinks a day, you could have a complication, called alcohol withdrawal, after surgery.  Alcohol withdrawal is a set of symptoms that people have when they suddenly stop drinking after using alcohol  for a long time. During withdrawal, a person's central nervous system overreacts. This can cause mild symptoms such as shakiness, sweating or  hallucinating. It can also cause other more serious side effects. If not treated properly, alcohol withdrawal can cause potentially life-threatening complications after surgery. This can include tremors, seizures, hallucinations, delirium tremors, and even death. Untreated alcohol withdrawal often leads to a longer stay in the hospital, potentially in the Intensive Care Unit.  Chronic heavy drinking also can interfere with several organ systems and biochemical processes in the body.  This interference can cause serious, even life-threatening complications.  Your care team can offer alcohol withdrawal treatment to help:  Decrease the risk of seizures and delirium tremors after surgery  Decrease the risk we will need to restrain you for your own safety or the safety of others  Decrease your risk of falling after surgery  Reduce the use of potent sedative medications  Reduce the time you stay in the hospital after surgery  Reduce the time you might spend on a mechanical ventilator to help you breathe  Lower incidence of organ failure and biochemical complications  Talk to a member of your care team or your primary care physician about your alcohol use if you feel you may be at risk of any of these complications.        Smoking and Surgery  Quitting smoking is extremely important for a successful surgery and recovery. Cigarette smoking compromises your immune system. This increases your risk of an infection after surgery. Quitting the habit before surgery will decrease the surgical risks associated with smoking.

## 2023-07-07 ENCOUNTER — ANESTHESIA (OUTPATIENT)
Dept: SURGERY | Facility: HOSPITAL | Age: 73
End: 2023-07-07
Payer: MEDICARE

## 2023-07-07 ENCOUNTER — HOSPITAL ENCOUNTER (OUTPATIENT)
Facility: HOSPITAL | Age: 73
Discharge: HOME | End: 2023-07-08
Attending: SURGERY | Admitting: NURSE PRACTITIONER
Payer: MEDICARE

## 2023-07-07 ENCOUNTER — ANESTHESIA EVENT (OUTPATIENT)
Dept: SURGERY | Facility: HOSPITAL | Age: 73
End: 2023-07-07
Payer: MEDICARE

## 2023-07-07 DIAGNOSIS — Z95.2 H/O MITRAL VALVE REPLACEMENT: ICD-10-CM

## 2023-07-07 DIAGNOSIS — I73.9 PVD (PERIPHERAL VASCULAR DISEASE): Primary | ICD-10-CM

## 2023-07-07 DIAGNOSIS — I10 HYPERTENSION: ICD-10-CM

## 2023-07-07 LAB
ANION GAP SERPL CALCULATED.3IONS-SCNC: 8 MMOL/L (ref 7–16)
APTT PPP: 48.3 SECONDS (ref 25.2–37.3)
BASOPHILS # BLD AUTO: 0.03 K/UL (ref 0–0.2)
BASOPHILS NFR BLD AUTO: 0.6 % (ref 0–1)
BUN SERPL-MCNC: 10 MG/DL (ref 7–18)
BUN/CREAT SERPL: 18 (ref 6–20)
CALCIUM SERPL-MCNC: 9.1 MG/DL (ref 8.5–10.1)
CHLORIDE SERPL-SCNC: 104 MMOL/L (ref 98–107)
CO2 SERPL-SCNC: 31 MMOL/L (ref 21–32)
CREAT SERPL-MCNC: 0.57 MG/DL (ref 0.55–1.02)
DIFFERENTIAL METHOD BLD: ABNORMAL
EGFR (NO RACE VARIABLE) (RUSH/TITUS): 97 ML/MIN/1.73M2
EOSINOPHIL # BLD AUTO: 0.06 K/UL (ref 0–0.5)
EOSINOPHIL NFR BLD AUTO: 1.3 % (ref 1–4)
ERYTHROCYTE [DISTWIDTH] IN BLOOD BY AUTOMATED COUNT: 13.3 % (ref 11.5–14.5)
GLUCOSE SERPL-MCNC: 81 MG/DL (ref 74–106)
HCT VFR BLD AUTO: 40.7 % (ref 38–47)
HGB BLD-MCNC: 13 G/DL (ref 12–16)
IMM GRANULOCYTES # BLD AUTO: 0.02 K/UL (ref 0–0.04)
IMM GRANULOCYTES NFR BLD: 0.4 % (ref 0–0.4)
INDIRECT COOMBS: NORMAL
INR BLD: 1.13
LYMPHOCYTES # BLD AUTO: 1.16 K/UL (ref 1–4.8)
LYMPHOCYTES NFR BLD AUTO: 24.2 % (ref 27–41)
MCH RBC QN AUTO: 30.2 PG (ref 27–31)
MCHC RBC AUTO-ENTMCNC: 31.9 G/DL (ref 32–36)
MCV RBC AUTO: 94.7 FL (ref 80–96)
MONOCYTES # BLD AUTO: 0.4 K/UL (ref 0–0.8)
MONOCYTES NFR BLD AUTO: 8.3 % (ref 2–6)
MPC BLD CALC-MCNC: 12.4 FL (ref 9.4–12.4)
NEUTROPHILS # BLD AUTO: 3.13 K/UL (ref 1.8–7.7)
NEUTROPHILS NFR BLD AUTO: 65.2 % (ref 53–65)
NRBC # BLD AUTO: 0 X10E3/UL
NRBC, AUTO (.00): 0 %
PLATELET # BLD AUTO: 192 K/UL (ref 150–400)
POTASSIUM SERPL-SCNC: 4.4 MMOL/L (ref 3.5–5.1)
PROTHROMBIN TIME: 14.4 SECONDS (ref 11.7–14.7)
RBC # BLD AUTO: 4.3 M/UL (ref 4.2–5.4)
RH BLD: NORMAL
SODIUM SERPL-SCNC: 139 MMOL/L (ref 136–145)
SPECIMEN OUTDATE: NORMAL
WBC # BLD AUTO: 4.8 K/UL (ref 4.5–11)

## 2023-07-07 PROCEDURE — 71000033 HC RECOVERY, INTIAL HOUR: Performed by: SURGERY

## 2023-07-07 PROCEDURE — C1769 GUIDE WIRE: HCPCS | Performed by: SURGERY

## 2023-07-07 PROCEDURE — 75710 ARTERY X-RAYS ARM/LEG: CPT | Mod: 26,59,LT, | Performed by: SURGERY

## 2023-07-07 PROCEDURE — D9220A PRA ANESTHESIA: Mod: ANES,,, | Performed by: ANESTHESIOLOGY

## 2023-07-07 PROCEDURE — 37000009 HC ANESTHESIA EA ADD 15 MINS: Performed by: SURGERY

## 2023-07-07 PROCEDURE — 37224 PR FEM/POPL REVAS W/TLA: CPT | Mod: LT,,, | Performed by: SURGERY

## 2023-07-07 PROCEDURE — 63600175 PHARM REV CODE 636 W HCPCS: Performed by: NURSE ANESTHETIST, CERTIFIED REGISTERED

## 2023-07-07 PROCEDURE — 27000716 HC OXISENSOR PROBE, ANY SIZE: Performed by: NURSE ANESTHETIST, CERTIFIED REGISTERED

## 2023-07-07 PROCEDURE — 25000003 PHARM REV CODE 250: Performed by: NURSE ANESTHETIST, CERTIFIED REGISTERED

## 2023-07-07 PROCEDURE — 25000003 PHARM REV CODE 250: Performed by: ANESTHESIOLOGY

## 2023-07-07 PROCEDURE — 86900 BLOOD TYPING SEROLOGIC ABO: CPT | Performed by: SURGERY

## 2023-07-07 PROCEDURE — C1725 CATH, TRANSLUMIN NON-LASER: HCPCS | Performed by: SURGERY

## 2023-07-07 PROCEDURE — D9220A PRA ANESTHESIA: ICD-10-PCS | Mod: ANES,,, | Performed by: ANESTHESIOLOGY

## 2023-07-07 PROCEDURE — 80048 BASIC METABOLIC PNL TOTAL CA: CPT | Performed by: ANESTHESIOLOGY

## 2023-07-07 PROCEDURE — 63600175 PHARM REV CODE 636 W HCPCS: Performed by: SURGERY

## 2023-07-07 PROCEDURE — 36000706: Performed by: SURGERY

## 2023-07-07 PROCEDURE — 27000177 HC AIRWAY, LARYNGEAL MASK: Performed by: NURSE ANESTHETIST, CERTIFIED REGISTERED

## 2023-07-07 PROCEDURE — D9220A PRA ANESTHESIA: ICD-10-PCS | Mod: CRNA,,, | Performed by: NURSE ANESTHETIST, CERTIFIED REGISTERED

## 2023-07-07 PROCEDURE — 25000003 PHARM REV CODE 250: Performed by: SURGERY

## 2023-07-07 PROCEDURE — 85025 COMPLETE CBC W/AUTO DIFF WBC: CPT | Performed by: ANESTHESIOLOGY

## 2023-07-07 PROCEDURE — 36000707: Performed by: SURGERY

## 2023-07-07 PROCEDURE — C1894 INTRO/SHEATH, NON-LASER: HCPCS | Performed by: SURGERY

## 2023-07-07 PROCEDURE — D9220A PRA ANESTHESIA: Mod: CRNA,,, | Performed by: NURSE ANESTHETIST, CERTIFIED REGISTERED

## 2023-07-07 PROCEDURE — 37224 PR FEM/POPL REVAS W/TLA: ICD-10-PCS | Mod: LT,,, | Performed by: SURGERY

## 2023-07-07 PROCEDURE — 25500020 PHARM REV CODE 255: Performed by: SURGERY

## 2023-07-07 PROCEDURE — 93005 ELECTROCARDIOGRAM TRACING: CPT

## 2023-07-07 PROCEDURE — 93010 EKG 12-LEAD: ICD-10-PCS | Mod: ,,, | Performed by: HOSPITALIST

## 2023-07-07 PROCEDURE — 63600175 PHARM REV CODE 636 W HCPCS: Performed by: NURSE PRACTITIONER

## 2023-07-07 PROCEDURE — 93010 ELECTROCARDIOGRAM REPORT: CPT | Mod: ,,, | Performed by: HOSPITALIST

## 2023-07-07 PROCEDURE — 85730 THROMBOPLASTIN TIME PARTIAL: CPT | Performed by: ANESTHESIOLOGY

## 2023-07-07 PROCEDURE — 37000008 HC ANESTHESIA 1ST 15 MINUTES: Performed by: SURGERY

## 2023-07-07 PROCEDURE — 27000510 HC BLANKET BAIR HUGGER ANY SIZE: Performed by: NURSE ANESTHETIST, CERTIFIED REGISTERED

## 2023-07-07 PROCEDURE — C1887 CATHETER, GUIDING: HCPCS | Performed by: SURGERY

## 2023-07-07 PROCEDURE — 25000003 PHARM REV CODE 250: Performed by: NURSE PRACTITIONER

## 2023-07-07 PROCEDURE — 27201423 OPTIME MED/SURG SUP & DEVICES STERILE SUPPLY: Performed by: SURGERY

## 2023-07-07 PROCEDURE — 75710 PR  ANGIO EXTREMITY UNILAT: ICD-10-PCS | Mod: 26,59,LT, | Performed by: SURGERY

## 2023-07-07 RX ORDER — ONDANSETRON 2 MG/ML
4 INJECTION INTRAMUSCULAR; INTRAVENOUS EVERY 8 HOURS PRN
Status: DISCONTINUED | OUTPATIENT
Start: 2023-07-07 | End: 2023-07-08 | Stop reason: HOSPADM

## 2023-07-07 RX ORDER — LIDOCAINE HYDROCHLORIDE 20 MG/ML
INJECTION, SOLUTION EPIDURAL; INFILTRATION; INTRACAUDAL; PERINEURAL
Status: DISCONTINUED | OUTPATIENT
Start: 2023-07-07 | End: 2023-07-07

## 2023-07-07 RX ORDER — ONDANSETRON 4 MG/1
8 TABLET, ORALLY DISINTEGRATING ORAL EVERY 8 HOURS PRN
Status: DISCONTINUED | OUTPATIENT
Start: 2023-07-07 | End: 2023-07-08 | Stop reason: HOSPADM

## 2023-07-07 RX ORDER — HYDRALAZINE HYDROCHLORIDE 20 MG/ML
10 INJECTION INTRAMUSCULAR; INTRAVENOUS EVERY 4 HOURS PRN
Status: DISCONTINUED | OUTPATIENT
Start: 2023-07-07 | End: 2023-07-08 | Stop reason: HOSPADM

## 2023-07-07 RX ORDER — PROTAMINE SULFATE 10 MG/ML
INJECTION, SOLUTION INTRAVENOUS
Status: DISCONTINUED | OUTPATIENT
Start: 2023-07-07 | End: 2023-07-07

## 2023-07-07 RX ORDER — HEPARIN SODIUM 1000 [USP'U]/ML
INJECTION, SOLUTION INTRAVENOUS; SUBCUTANEOUS
Status: DISCONTINUED | OUTPATIENT
Start: 2023-07-07 | End: 2023-07-07

## 2023-07-07 RX ORDER — DIPHENHYDRAMINE HYDROCHLORIDE 50 MG/ML
25 INJECTION INTRAMUSCULAR; INTRAVENOUS EVERY 6 HOURS PRN
Status: DISCONTINUED | OUTPATIENT
Start: 2023-07-07 | End: 2023-07-07 | Stop reason: HOSPADM

## 2023-07-07 RX ORDER — MORPHINE SULFATE 10 MG/ML
4 INJECTION INTRAMUSCULAR; INTRAVENOUS; SUBCUTANEOUS EVERY 4 HOURS PRN
Status: DISCONTINUED | OUTPATIENT
Start: 2023-07-07 | End: 2023-07-08 | Stop reason: HOSPADM

## 2023-07-07 RX ORDER — SODIUM CHLORIDE 9 MG/ML
INJECTION, SOLUTION INTRAVENOUS CONTINUOUS
Status: DISCONTINUED | OUTPATIENT
Start: 2023-07-07 | End: 2023-07-08 | Stop reason: HOSPADM

## 2023-07-07 RX ORDER — DEXAMETHASONE SODIUM PHOSPHATE 4 MG/ML
INJECTION, SOLUTION INTRA-ARTICULAR; INTRALESIONAL; INTRAMUSCULAR; INTRAVENOUS; SOFT TISSUE
Status: DISCONTINUED | OUTPATIENT
Start: 2023-07-07 | End: 2023-07-07

## 2023-07-07 RX ORDER — TALC
6 POWDER (GRAM) TOPICAL NIGHTLY PRN
Status: DISCONTINUED | OUTPATIENT
Start: 2023-07-07 | End: 2023-07-08 | Stop reason: HOSPADM

## 2023-07-07 RX ORDER — MORPHINE SULFATE 10 MG/ML
4 INJECTION INTRAMUSCULAR; INTRAVENOUS; SUBCUTANEOUS EVERY 5 MIN PRN
Status: DISCONTINUED | OUTPATIENT
Start: 2023-07-07 | End: 2023-07-07 | Stop reason: HOSPADM

## 2023-07-07 RX ORDER — ENOXAPARIN SODIUM 100 MG/ML
60 INJECTION SUBCUTANEOUS EVERY 12 HOURS
Status: DISCONTINUED | OUTPATIENT
Start: 2023-07-07 | End: 2023-07-08 | Stop reason: HOSPADM

## 2023-07-07 RX ORDER — ACETAMINOPHEN 325 MG/1
325 TABLET ORAL EVERY 4 HOURS PRN
Status: DISCONTINUED | OUTPATIENT
Start: 2023-07-07 | End: 2023-07-08 | Stop reason: HOSPADM

## 2023-07-07 RX ORDER — ONDANSETRON 2 MG/ML
INJECTION INTRAMUSCULAR; INTRAVENOUS
Status: DISCONTINUED | OUTPATIENT
Start: 2023-07-07 | End: 2023-07-07

## 2023-07-07 RX ORDER — CARVEDILOL 6.25 MG/1
6.25 TABLET ORAL 2 TIMES DAILY WITH MEALS
Status: DISCONTINUED | OUTPATIENT
Start: 2023-07-07 | End: 2023-07-08 | Stop reason: HOSPADM

## 2023-07-07 RX ORDER — ASPIRIN 325 MG
325 TABLET ORAL DAILY
Status: DISCONTINUED | OUTPATIENT
Start: 2023-07-07 | End: 2023-07-08 | Stop reason: HOSPADM

## 2023-07-07 RX ORDER — HEPARIN SODIUM 1000 [USP'U]/ML
INJECTION, SOLUTION INTRAVENOUS; SUBCUTANEOUS
Status: DISCONTINUED | OUTPATIENT
Start: 2023-07-07 | End: 2023-07-07 | Stop reason: HOSPADM

## 2023-07-07 RX ORDER — ONDANSETRON 2 MG/ML
4 INJECTION INTRAMUSCULAR; INTRAVENOUS DAILY PRN
Status: DISCONTINUED | OUTPATIENT
Start: 2023-07-07 | End: 2023-07-07 | Stop reason: HOSPADM

## 2023-07-07 RX ORDER — PROPOFOL 10 MG/ML
INJECTION, EMULSION INTRAVENOUS
Status: DISCONTINUED | OUTPATIENT
Start: 2023-07-07 | End: 2023-07-07

## 2023-07-07 RX ORDER — PHENYLEPHRINE HYDROCHLORIDE 10 MG/ML
INJECTION INTRAVENOUS
Status: DISCONTINUED | OUTPATIENT
Start: 2023-07-07 | End: 2023-07-07

## 2023-07-07 RX ORDER — LOSARTAN POTASSIUM 50 MG/1
50 TABLET ORAL DAILY
Status: DISCONTINUED | OUTPATIENT
Start: 2023-07-07 | End: 2023-07-08 | Stop reason: HOSPADM

## 2023-07-07 RX ORDER — CLOPIDOGREL BISULFATE 75 MG/1
75 TABLET ORAL DAILY
Status: DISCONTINUED | OUTPATIENT
Start: 2023-07-07 | End: 2023-07-08 | Stop reason: HOSPADM

## 2023-07-07 RX ORDER — HYDROMORPHONE HYDROCHLORIDE 2 MG/ML
0.5 INJECTION, SOLUTION INTRAMUSCULAR; INTRAVENOUS; SUBCUTANEOUS EVERY 5 MIN PRN
Status: DISCONTINUED | OUTPATIENT
Start: 2023-07-07 | End: 2023-07-07 | Stop reason: HOSPADM

## 2023-07-07 RX ORDER — CHLORTHALIDONE 25 MG/1
25 TABLET ORAL DAILY
Status: DISCONTINUED | OUTPATIENT
Start: 2023-07-07 | End: 2023-07-08 | Stop reason: HOSPADM

## 2023-07-07 RX ORDER — MEPERIDINE HYDROCHLORIDE 25 MG/ML
25 INJECTION INTRAMUSCULAR; INTRAVENOUS; SUBCUTANEOUS EVERY 10 MIN PRN
Status: DISCONTINUED | OUTPATIENT
Start: 2023-07-07 | End: 2023-07-07 | Stop reason: HOSPADM

## 2023-07-07 RX ORDER — AMOXICILLIN 250 MG
1 CAPSULE ORAL 2 TIMES DAILY
Status: DISCONTINUED | OUTPATIENT
Start: 2023-07-07 | End: 2023-07-08 | Stop reason: HOSPADM

## 2023-07-07 RX ORDER — DILTIAZEM HYDROCHLORIDE 120 MG/1
120 CAPSULE, COATED, EXTENDED RELEASE ORAL DAILY
Status: DISCONTINUED | OUTPATIENT
Start: 2023-07-07 | End: 2023-07-08 | Stop reason: HOSPADM

## 2023-07-07 RX ORDER — DIAZEPAM 5 MG/1
10 TABLET ORAL ONCE
Status: COMPLETED | OUTPATIENT
Start: 2023-07-07 | End: 2023-07-07

## 2023-07-07 RX ORDER — ACETAMINOPHEN 325 MG/1
650 TABLET ORAL EVERY 4 HOURS PRN
Status: DISCONTINUED | OUTPATIENT
Start: 2023-07-07 | End: 2023-07-08 | Stop reason: HOSPADM

## 2023-07-07 RX ORDER — CEFAZOLIN SODIUM 1 G/3ML
INJECTION, POWDER, FOR SOLUTION INTRAMUSCULAR; INTRAVENOUS
Status: DISCONTINUED | OUTPATIENT
Start: 2023-07-07 | End: 2023-07-07

## 2023-07-07 RX ORDER — FENTANYL CITRATE 50 UG/ML
INJECTION, SOLUTION INTRAMUSCULAR; INTRAVENOUS
Status: DISCONTINUED | OUTPATIENT
Start: 2023-07-07 | End: 2023-07-07

## 2023-07-07 RX ORDER — HYDROCODONE BITARTRATE AND ACETAMINOPHEN 5; 325 MG/1; MG/1
1 TABLET ORAL EVERY 4 HOURS PRN
Status: DISCONTINUED | OUTPATIENT
Start: 2023-07-07 | End: 2023-07-08 | Stop reason: HOSPADM

## 2023-07-07 RX ORDER — METOPROLOL TARTRATE 50 MG/1
50 TABLET ORAL 2 TIMES DAILY
Status: DISCONTINUED | OUTPATIENT
Start: 2023-07-07 | End: 2023-07-08 | Stop reason: HOSPADM

## 2023-07-07 RX ADMIN — METOPROLOL TARTRATE 50 MG: 50 TABLET, FILM COATED ORAL at 11:07

## 2023-07-07 RX ADMIN — SODIUM CHLORIDE: 9 INJECTION, SOLUTION INTRAVENOUS at 07:07

## 2023-07-07 RX ADMIN — CARVEDILOL 6.25 MG: 6.25 TABLET, FILM COATED ORAL at 11:07

## 2023-07-07 RX ADMIN — PHENYLEPHRINE HYDROCHLORIDE 100 MCG: 10 INJECTION INTRAVENOUS at 08:07

## 2023-07-07 RX ADMIN — CHLORTHALIDONE 25 MG: 25 TABLET ORAL at 11:07

## 2023-07-07 RX ADMIN — PHENYLEPHRINE HYDROCHLORIDE 100 MCG: 10 INJECTION INTRAVENOUS at 09:07

## 2023-07-07 RX ADMIN — CARVEDILOL 6.25 MG: 6.25 TABLET, FILM COATED ORAL at 04:07

## 2023-07-07 RX ADMIN — DIAZEPAM 10 MG: 5 TABLET ORAL at 07:07

## 2023-07-07 RX ADMIN — LIDOCAINE HYDROCHLORIDE 80 MG: 20 INJECTION, SOLUTION INTRAVENOUS at 08:07

## 2023-07-07 RX ADMIN — PROPOFOL 130 MG: 10 INJECTION, EMULSION INTRAVENOUS at 08:07

## 2023-07-07 RX ADMIN — CEFAZOLIN 2 G: 1 INJECTION, POWDER, FOR SOLUTION INTRAMUSCULAR; INTRAVENOUS; PARENTERAL at 08:07

## 2023-07-07 RX ADMIN — FENTANYL CITRATE 100 MCG: 50 INJECTION INTRAMUSCULAR; INTRAVENOUS at 08:07

## 2023-07-07 RX ADMIN — ENOXAPARIN SODIUM 60 MG: 100 INJECTION SUBCUTANEOUS at 08:07

## 2023-07-07 RX ADMIN — SENNOSIDES AND DOCUSATE SODIUM 1 TABLET: 50; 8.6 TABLET ORAL at 11:07

## 2023-07-07 RX ADMIN — DEXAMETHASONE SODIUM PHOSPHATE 8 MG: 4 INJECTION, SOLUTION INTRA-ARTICULAR; INTRALESIONAL; INTRAMUSCULAR; INTRAVENOUS; SOFT TISSUE at 08:07

## 2023-07-07 RX ADMIN — LOSARTAN POTASSIUM 50 MG: 50 TABLET, FILM COATED ORAL at 11:07

## 2023-07-07 RX ADMIN — ONDANSETRON 4 MG: 2 INJECTION INTRAMUSCULAR; INTRAVENOUS at 08:07

## 2023-07-07 RX ADMIN — CLOPIDOGREL BISULFATE 75 MG: 75 TABLET ORAL at 11:07

## 2023-07-07 RX ADMIN — HEPARIN SODIUM 2000 UNITS: 1000 INJECTION INTRAVENOUS; SUBCUTANEOUS at 08:07

## 2023-07-07 RX ADMIN — ASPIRIN 325 MG ORAL TABLET 325 MG: 325 PILL ORAL at 11:07

## 2023-07-07 RX ADMIN — PROTAMINE SULFATE 15 MG: 10 INJECTION, SOLUTION INTRAVENOUS at 09:07

## 2023-07-07 RX ADMIN — DILTIAZEM HYDROCHLORIDE 120 MG: 120 CAPSULE, COATED, EXTENDED RELEASE ORAL at 11:07

## 2023-07-07 RX ADMIN — SENNOSIDES AND DOCUSATE SODIUM 1 TABLET: 50; 8.6 TABLET ORAL at 08:07

## 2023-07-07 RX ADMIN — METOPROLOL TARTRATE 50 MG: 50 TABLET, FILM COATED ORAL at 08:07

## 2023-07-07 RX ADMIN — HYDROCODONE BITARTRATE AND ACETAMINOPHEN 1 TABLET: 5; 325 TABLET ORAL at 08:07

## 2023-07-07 NOTE — ANESTHESIA PREPROCEDURE EVALUATION
07/07/2023  Khalida Leos is a 72 y.o., female.      Pre-op Assessment    I have reviewed the Patient Summary Reports.     I have reviewed the Nursing Notes. I have reviewed the NPO Status.   I have reviewed the Medications.     Review of Systems  Anesthesia Hx:  Denies Family Hx of Anesthesia complications.   Denies Personal Hx of Anesthesia complications.   Social:  No Alcohol Use, Smoker    Hematology/Oncology:  Hematology Normal   Oncology Normal     EENT/Dental:EENT/Dental Normal   Cardiovascular:   Hypertension Dysrhythmias atrial fibrillation PVD ECG has been reviewed.    Pulmonary:  Pulmonary Normal    Renal/:  Renal/ Normal     Hepatic/GI:  Hepatic/GI Normal    Musculoskeletal:  Musculoskeletal Normal    Neurological:  Neurology Normal    Endocrine:  Endocrine Normal    Dermatological:  Skin Normal    Psych:  Psychiatric Normal           Physical Exam  General: Well nourished, Cooperative, Alert and Oriented    Airway:  Mallampati: II / II  Mouth Opening: Normal  TM Distance: Normal  Neck ROM: Normal ROM    Dental:  Intact    Chest/Lungs:  Clear to auscultation    Heart:  Rate: Normal  Rhythm: Regular Rhythm  Sounds: Normal        Chemistry        Component Value Date/Time     03/25/2022 1318    K 4.7 03/25/2022 1318     (H) 03/25/2022 1318    CO2 28 03/25/2022 1318    BUN 11 03/25/2022 1318    CREATININE 0.74 06/14/2023 1457     (H) 03/25/2022 1318        Component Value Date/Time    CALCIUM 8.7 03/25/2022 1318    EGFRNONAA 80 03/25/2022 1318        Lab Results   Component Value Date    WBC 7.87 03/25/2022    HGB 14.0 03/25/2022    HCT 45.3 03/25/2022     03/25/2022     No results found for this or any previous visit.      Anesthesia Plan  Type of Anesthesia, risks & benefits discussed:    Anesthesia Type: Gen Supraglottic Airway  Intra-op Monitoring Plan: Standard ASA  Monitors  Post Op Pain Control Plan: multimodal analgesia  Induction:  IV  Airway Plan: Direct  Informed Consent: Informed consent signed with the Patient and all parties understand the risks and agree with anesthesia plan.  All questions answered.   ASA Score: 3  Day of Surgery Review of History & Physical: H&P Update referred to the surgeon/provider.I have interviewed and examined the patient. I have reviewed the patient's H&P dated:     Ready For Surgery From Anesthesia Perspective.     .

## 2023-07-07 NOTE — OP NOTE
[facility]  Surgery Department  Operative Note    SUMMARY     Date of Procedure: 7/7/2023     Procedure: Procedure(s) (LRB):  ARTERIOGRAM-LEG AND ULTRASOUND (Left)  PTA, ARTERY, PERIPHERAL (Left)     Surgeon(s) and Role:     * Zara Zaldivar MD - Primary    Assisting Surgeon: None    Pre-Operative Diagnosis: PVD (peripheral vascular disease) [I73.9]  H/O mitral valve replacement [Z95.2]    Post-Operative Diagnosis: Post-Op Diagnosis Codes:     * PVD (peripheral vascular disease) [I73.9]     * H/O mitral valve replacement [Z95.2]    Anesthesia: General/MAC    Operative Findings (including complications, if any):  SFA region , at the adductor hiatus was probably 70-75% stenosed two-vessel runoff to the foot    Description of Technical Procedures:  Bilateral groins prepped draped Ioban drape applied.  Utilize ultrasound guidance accessed the right common femoral artery J-wire placed 5 Thai sheath.  Catheter was advanced into the external iliac followed by the common iliac followed by the distal aorta into the left common iliac and left external iliac into the left SFA exchanged for stiff Glidewire placed a 6 Thai long Terumo sheath an angiogram performed left SFA in the distal runoff.  With the above findings.  We traversed the lesion with command guidewire.  We next advanced a 4 x 80 drug coated balloon insufflated to profile for 3 minutes left this down performed completion angiogram showed excellent angiographic result there was a small non flow-limiting dissection with no residual stenosis.  Patient had been heparinized ACT was followed.  Heparin was reversed protamine sheath removed and pressure held per protocol    Significant Surgical Tasks Conducted by the Assistant(s), if Applicable:  Held pressure    Estimated Blood Loss (EBL): * No values recorded between 7/7/2023  8:40 AM and 7/7/2023  9:34 AM *2cc           Implants: * No implants in log *    Specimens:   Specimen (24h ago,  onward)      None                    Condition: Good    Disposition: PACU - hemodynamically stable.    Attestation: I was present and scrubbed for the entire procedure.

## 2023-07-08 VITALS
RESPIRATION RATE: 17 BRPM | HEART RATE: 61 BPM | OXYGEN SATURATION: 99 % | HEIGHT: 63 IN | WEIGHT: 124 LBS | DIASTOLIC BLOOD PRESSURE: 65 MMHG | TEMPERATURE: 97 F | SYSTOLIC BLOOD PRESSURE: 124 MMHG | BODY MASS INDEX: 21.97 KG/M2

## 2023-07-08 LAB
ANION GAP SERPL CALCULATED.3IONS-SCNC: 9 MMOL/L (ref 7–16)
BUN SERPL-MCNC: 16 MG/DL (ref 7–18)
BUN/CREAT SERPL: 23 (ref 6–20)
CALCIUM SERPL-MCNC: 8.7 MG/DL (ref 8.5–10.1)
CHLORIDE SERPL-SCNC: 103 MMOL/L (ref 98–107)
CO2 SERPL-SCNC: 28 MMOL/L (ref 21–32)
CREAT SERPL-MCNC: 0.69 MG/DL (ref 0.55–1.02)
EGFR (NO RACE VARIABLE) (RUSH/TITUS): 92 ML/MIN/1.73M2
GLUCOSE SERPL-MCNC: 110 MG/DL (ref 74–106)
HCT VFR BLD AUTO: 37.6 % (ref 38–47)
HGB BLD-MCNC: 12.1 G/DL (ref 12–16)
POTASSIUM SERPL-SCNC: 4.3 MMOL/L (ref 3.5–5.1)
SODIUM SERPL-SCNC: 136 MMOL/L (ref 136–145)

## 2023-07-08 PROCEDURE — 99499 UNLISTED E&M SERVICE: CPT | Mod: ,,, | Performed by: SURGERY

## 2023-07-08 PROCEDURE — 63600175 PHARM REV CODE 636 W HCPCS: Performed by: NURSE PRACTITIONER

## 2023-07-08 PROCEDURE — 80048 BASIC METABOLIC PNL TOTAL CA: CPT | Performed by: NURSE PRACTITIONER

## 2023-07-08 PROCEDURE — 99499 NO LOS: ICD-10-PCS | Mod: ,,, | Performed by: SURGERY

## 2023-07-08 PROCEDURE — 85014 HEMATOCRIT: CPT | Performed by: NURSE PRACTITIONER

## 2023-07-08 PROCEDURE — 94761 N-INVAS EAR/PLS OXIMETRY MLT: CPT

## 2023-07-08 PROCEDURE — 25000003 PHARM REV CODE 250: Performed by: NURSE PRACTITIONER

## 2023-07-08 PROCEDURE — 85018 HEMOGLOBIN: CPT | Performed by: NURSE PRACTITIONER

## 2023-07-08 RX ORDER — CLOPIDOGREL BISULFATE 75 MG/1
75 TABLET ORAL DAILY
Qty: 30 TABLET | Refills: 11 | Status: SHIPPED | OUTPATIENT
Start: 2023-07-09 | End: 2024-03-25 | Stop reason: SDUPTHER

## 2023-07-08 RX ORDER — WARFARIN SODIUM 5 MG/1
10 TABLET ORAL ONCE
Qty: 2 TABLET | Refills: 0 | Status: SHIPPED | OUTPATIENT
Start: 2023-07-08 | End: 2023-07-08

## 2023-07-08 RX ORDER — CLOPIDOGREL BISULFATE 75 MG/1
75 TABLET ORAL DAILY
Status: DISCONTINUED | OUTPATIENT
Start: 2023-07-08 | End: 2023-07-08

## 2023-07-08 RX ADMIN — CARVEDILOL 6.25 MG: 6.25 TABLET, FILM COATED ORAL at 08:07

## 2023-07-08 RX ADMIN — CHLORTHALIDONE 25 MG: 25 TABLET ORAL at 08:07

## 2023-07-08 RX ADMIN — METOPROLOL TARTRATE 50 MG: 50 TABLET, FILM COATED ORAL at 08:07

## 2023-07-08 RX ADMIN — CLOPIDOGREL BISULFATE 75 MG: 75 TABLET ORAL at 08:07

## 2023-07-08 RX ADMIN — ENOXAPARIN SODIUM 60 MG: 100 INJECTION SUBCUTANEOUS at 08:07

## 2023-07-08 RX ADMIN — LOSARTAN POTASSIUM 50 MG: 50 TABLET, FILM COATED ORAL at 08:07

## 2023-07-08 RX ADMIN — ASPIRIN 325 MG ORAL TABLET 325 MG: 325 PILL ORAL at 08:07

## 2023-07-08 RX ADMIN — SODIUM CHLORIDE: 9 INJECTION, SOLUTION INTRAVENOUS at 05:07

## 2023-07-08 RX ADMIN — SENNOSIDES AND DOCUSATE SODIUM 1 TABLET: 50; 8.6 TABLET ORAL at 08:07

## 2023-07-08 RX ADMIN — DILTIAZEM HYDROCHLORIDE 120 MG: 120 CAPSULE, COATED, EXTENDED RELEASE ORAL at 08:07

## 2023-07-08 NOTE — DISCHARGE SUMMARY
[facility]  General Surgery  Discharge Summary      Patient Name: Khalida Leos  MRN: [id number]  Admission Date: 7/7/2023  Hospital Length of Stay: 0 days  Discharge Date and Time: No discharge date for patient encounter.  Attending Physician: Zara Zaldivar MD   Discharging Provider: Zara Zaldivar MD  Primary Care Provider: Anjel Kasper MD    HPI:   No notes on file    Procedure(s) (LRB):  ARTERIOGRAM-LEG AND ULTRASOUND (Left)  PTA, ARTERY, PERIPHERAL (Left)      Indwelling Lines/Drains at time of discharge:   Lines/Drains/Airways     None               Hospital Course: Patient admitted the hospital    Underwent left SFA angioplasty with drug coated balloon     Doing well has palpable dorsalis pedis     She will start Lovenox shots at home continue for 5 days     She will start Coumadin 10 today tender more and regular dose starting Monday    She will also be on Plavix    Educated patient      Goals of Care Treatment Preferences:  Code Status: Full Code      Consults:     Significant Diagnostic Studies: N/A    Pending Diagnostic Studies:     Procedure Component Value Units Date/Time    EKG 12-lead [[id number]] Collected: 07/07/23 0744    Order Status: Sent Lab Status: In process Updated: 07/07/23 0824    Narrative:      Test Reason : I10,    Vent. Rate : 080 BPM     Atrial Rate : 000 BPM     P-R Int : 000 ms          QRS Dur : 126 ms      QT Int : 416 ms       P-R-T Axes : 000 -76 069 degrees     QTc Int : 479 ms    Atrial fibrillation  Right bundle branch block  Left anterior fascicular block   Bifascicular block   Abnormal ECG  No previous ECGs available    Referred By: ZARA ZALDIVAR           Confirmed By:     EXTRA TUBES [[id number]] Collected: 07/07/23 1102    Order Status: Sent Lab Status: No result     Specimen: Blood, Venous     Narrative:      The following orders were created for panel order EXTRA TUBES.  Procedure                               Abnormality         Status                      ---------                               -----------         ------                     Blood Bank Hold[[id number]]                                                               Please view results for these tests on the individual orders.    EXTRA TUBES [[id number]] Collected: 07/07/23 0805    Order Status: Sent Lab Status: In process Updated: 07/07/23 0805    Specimen: Blood, Venous     Narrative:      The following orders were created for panel order EXTRA TUBES.  Procedure                               Abnormality         Status                     ---------                               -----------         ------                     Pink Top Hold[[id number]]                                    In process                   Please view results for these tests on the individual orders.    EXTRA TUBES [[id number]] Collected: 07/07/23 0735    Order Status: Sent Lab Status: In process Updated: 07/07/23 0735    Specimen: Blood, Venous     Narrative:      The following orders were created for panel order EXTRA TUBES.  Procedure                               Abnormality         Status                     ---------                               -----------         ------                     Gold Top Hold[[id number]]                                    In process                   Please view results for these tests on the individual orders.        Final Active Diagnoses:    Diagnosis Date Noted POA    PRINCIPAL PROBLEM:  PVD (peripheral vascular disease) [I73.9] 07/07/2023 Yes      Problems Resolved During this Admission:      Discharged Condition: good    Disposition: Home or Self Care    Follow Up:   Follow-up Information     ED Sorenson. Schedule an appointment as soon as possible for a visit in 3 week(s).    Specialty: Vascular Surgery  Why: post op f/u  Contact information:  [address] [place] [address] [facility] [address]  [phone]                       Patient  Instructions:      Diet general     Other restrictions (specify):   Order Comments: No driving may shower, Monday start taking Coumadin as usual, today take 10 mg prior to leaving the hospital common take 10 mg tomorrow, Monday start her normal regular dosing.  Resume Lovenox at home 60 mg subQ b.i.d. for 5 days     Remove dressing in 48 hours     Medications:  Reconciled Home Medications:      Medication List      START taking these medications    clopidogreL 75 mg tablet  Commonly known as: PLAVIX  Take 1 tablet (75 mg total) by mouth once daily.  Start taking on: July 9, 2023        CHANGE how you take these medications    * warfarin 7.5 MG tablet  Commonly known as: COUMADIN  Take 7.5 mg by mouth Daily.  What changed: Another medication with the same name was added. Make sure you understand how and when to take each.     * warfarin 5 MG tablet  Commonly known as: COUMADIN  Take 2 tablets (10 mg total) by mouth once. for 1 dose  What changed: You were already taking a medication with the same name, and this prescription was added. Make sure you understand how and when to take each.         * This list has 2 medication(s) that are the same as other medications prescribed for you. Read the directions carefully, and ask your doctor or other care provider to review them with you.            CONTINUE taking these medications    acetaminophen 650 MG Tbsr  Commonly known as: TYLENOL  Take 650 mg by mouth every 8 (eight) hours.     carvediloL 6.25 MG tablet  Commonly known as: COREG  Take 6.25 mg by mouth 2 (two) times daily with meals.     chlorthalidone 25 MG Tab  Commonly known as: HYGROTEN  Take 25 mg by mouth once daily.     diltiaZEM HCl 120 mg 24 hr capsule  Commonly known as: TIAZAC  Take 120 mg by mouth once daily.     losartan 50 MG tablet  Commonly known as: COZAAR  Take 50 mg by mouth once daily.     metoprolol tartrate 50 MG tablet  Commonly known as: LOPRESSOR  Take 50 mg by mouth 2 (two) times daily.     *  SantyL ointment  Generic drug: collagenase  Apply topically once daily.     * SantyL ointment  Generic drug: collagenase  Apply topically once daily.         * This list has 2 medication(s) that are the same as other medications prescribed for you. Read the directions carefully, and ask your doctor or other care provider to review them with you.              Time spent on the discharge of patient: 25 minutes    Zara Zaldivar MD  General Surgery  [facility]

## 2023-07-08 NOTE — NURSING
Contacted patients grand daughter and informed her of patient being discharged. She stated she would call the grand son and let him know. Patient denies any needs or concerns at this time.

## 2023-07-08 NOTE — HOSPITAL COURSE
Patient admitted the hospital    Underwent left SFA angioplasty with drug coated balloon     Doing well has palpable dorsalis pedis     She will start Lovenox shots at home continue for 5 days     She will start Coumadin 10 today tender more and regular dose starting Monday    She will also be on Plavix    Educated patient

## 2023-07-09 NOTE — ANESTHESIA POSTPROCEDURE EVALUATION
Anesthesia Post Evaluation    Patient: Khalida Leos    Procedure(s) Performed: Procedure(s) (LRB):  ARTERIOGRAM-LEG AND ULTRASOUND (Left)  PTA, ARTERY, PERIPHERAL (Left)    Final Anesthesia Type: general      Patient location during evaluation: PACU  Patient participation: Yes- Able to Participate  Level of consciousness: awake and alert  Post-procedure vital signs: reviewed and stable  Pain management: adequate  Airway patency: patent  JENNIFER mitigation strategies: Multimodal analgesia  PONV status at discharge: No PONV  Anesthetic complications: no      Cardiovascular status: blood pressure returned to baseline  Respiratory status: unassisted  Hydration status: euvolemic  Follow-up not needed.          Vitals Value Taken Time   /65 07/08/23 0813   Temp 36.3 °C (97.3 °F) 07/08/23 0813   Pulse 61 07/08/23 0813   Resp 17 07/08/23 0813   SpO2 99 % 07/08/23 0813         Event Time   Out of Recovery 10:17:50         Pain/Tom Score: Pain Rating Post Med Admin: 2 (7/8/2023  9:10 AM)

## 2023-07-12 LAB — POC ACTIVATED CLOTTING TIME K: 248 SEC

## 2023-07-31 ENCOUNTER — OFFICE VISIT (OUTPATIENT)
Dept: SURGERY | Facility: CLINIC | Age: 73
End: 2023-07-31
Payer: MEDICARE

## 2023-07-31 VITALS
OXYGEN SATURATION: 98 % | RESPIRATION RATE: 17 BRPM | HEIGHT: 64 IN | BODY MASS INDEX: 20.63 KG/M2 | WEIGHT: 120.81 LBS | DIASTOLIC BLOOD PRESSURE: 82 MMHG | SYSTOLIC BLOOD PRESSURE: 166 MMHG | HEART RATE: 87 BPM

## 2023-07-31 DIAGNOSIS — I73.9 PVD (PERIPHERAL VASCULAR DISEASE): Primary | ICD-10-CM

## 2023-07-31 DIAGNOSIS — Z09 POSTOP CHECK: ICD-10-CM

## 2023-07-31 PROCEDURE — 99214 OFFICE O/P EST MOD 30 MIN: CPT | Mod: PBBFAC | Performed by: NURSE PRACTITIONER

## 2023-07-31 PROCEDURE — 99024 POSTOP FOLLOW-UP VISIT: CPT | Mod: ,,, | Performed by: NURSE PRACTITIONER

## 2023-07-31 PROCEDURE — 99024 PR POST-OP FOLLOW-UP VISIT: ICD-10-PCS | Mod: ,,, | Performed by: NURSE PRACTITIONER

## 2023-07-31 NOTE — PROGRESS NOTES
"Subjective:       Patient ID: Khalida Leos is a 72 y.o. female.    Chief Complaint: Pain (Patient is here today for ongoing lower leg pain, patient stated that after having a procedure done her legs have had ongoing pain, swelling and redness, adding that after she showers she has yellow colored fluids leak from the wounds. )    Follow-up PTA on the left 3 weeks ago Dr. Zaldivar palpable pulse left DP  Anterior shin wound is improving  family history includes Cancer in her father; Heart disease in her mother.  Past Medical History:   Diagnosis Date    History of mitral valve replacement     Hypertension       Past Surgical History:   Procedure Laterality Date    FLUOROSCOPIC ANGIOGRAPHY OF LOWER EXTREMITY WITH TOPICAL ULTRASOUND Left 7/7/2023    Procedure: ARTERIOGRAM-LEG AND ULTRASOUND;  Surgeon: Zara Zaldivar MD;  Location: [facility] OR;  Service: General;  Laterality: Left;    MITRAL VALVE REPLACEMENT      PTA, ARTERY, PERIPHERAL Left 7/7/2023    Procedure: PTA, ARTERY, PERIPHERAL;  Surgeon: Zara Zaldivar MD;  Location: [facility] OR;  Service: General;  Laterality: Left;  balloon       reports that she has been smoking cigarettes. She has never used smokeless tobacco. She reports that she does not drink alcohol.   HPI  Review of Systems      Objective:      BP (!) 166/82 (BP Location: Left arm, Patient Position: Sitting)   Pulse 87   Resp 17   Ht 5' 4" (1.626 m)   Wt 54.8 kg (120 lb 12.8 oz)   SpO2 98%   BMI 20.74 kg/m²    Physical Exam      Assessment:       1. PVD (peripheral vascular disease)    2. Postop check        Plan:       Continue Plavix keep scheduled appointment with Dr. Hernandez wound care left leg  Follow-up in 4 weeks with ABIs      "

## 2023-09-20 ENCOUNTER — PATIENT MESSAGE (OUTPATIENT)
Dept: ADMINISTRATIVE | Facility: HOSPITAL | Age: 73
End: 2023-09-20

## 2023-10-30 PROBLEM — Z09 POSTOP CHECK: Status: RESOLVED | Noted: 2023-07-31 | Resolved: 2023-10-30

## 2023-10-31 ENCOUNTER — OUTSIDE PLACE OF SERVICE (OUTPATIENT)
Dept: VASCULAR SURGERY | Facility: CLINIC | Age: 73
End: 2023-10-31
Payer: MEDICARE

## 2023-10-31 PROCEDURE — 99232 SBSQ HOSP IP/OBS MODERATE 35: CPT | Mod: ,,, | Performed by: NURSE PRACTITIONER

## 2023-10-31 PROCEDURE — 99232 PR SUBSEQUENT HOSPITAL CARE,LEVL II: ICD-10-PCS | Mod: ,,, | Performed by: NURSE PRACTITIONER

## 2023-11-03 ENCOUNTER — OFFICE VISIT (OUTPATIENT)
Dept: FAMILY MEDICINE | Facility: CLINIC | Age: 73
End: 2023-11-03
Payer: MEDICARE

## 2023-11-03 VITALS
HEART RATE: 98 BPM | HEIGHT: 64 IN | RESPIRATION RATE: 16 BRPM | OXYGEN SATURATION: 100 % | TEMPERATURE: 97 F | WEIGHT: 121 LBS | BODY MASS INDEX: 20.66 KG/M2 | SYSTOLIC BLOOD PRESSURE: 132 MMHG | DIASTOLIC BLOOD PRESSURE: 72 MMHG

## 2023-11-03 DIAGNOSIS — I10 ESSENTIAL HYPERTENSION, BENIGN: Primary | Chronic | ICD-10-CM

## 2023-11-03 DIAGNOSIS — I73.9 PVD (PERIPHERAL VASCULAR DISEASE): ICD-10-CM

## 2023-11-03 DIAGNOSIS — L03.116 CELLULITIS OF LEFT LEG: ICD-10-CM

## 2023-11-03 DIAGNOSIS — I42.0 NONISCHEMIC CONGESTIVE CARDIOMYOPATHY: ICD-10-CM

## 2023-11-03 DIAGNOSIS — I48.11 LONGSTANDING PERSISTENT ATRIAL FIBRILLATION: ICD-10-CM

## 2023-11-03 DIAGNOSIS — Z79.01 LONG TERM CURRENT USE OF ANTICOAGULANT: ICD-10-CM

## 2023-11-03 LAB
CTP QC/QA: YES
INR POC: 2 (ref 0–3.3)
PT, POC: 23.6 (ref 12–14.7)

## 2023-11-03 PROCEDURE — 85610 PROTHROMBIN TIME: CPT | Mod: RHCUB | Performed by: INTERNAL MEDICINE

## 2023-11-03 PROCEDURE — 99214 OFFICE O/P EST MOD 30 MIN: CPT | Mod: ,,, | Performed by: INTERNAL MEDICINE

## 2023-11-03 PROCEDURE — 99214 PR OFFICE/OUTPT VISIT, EST, LEVL IV, 30-39 MIN: ICD-10-PCS | Mod: ,,, | Performed by: INTERNAL MEDICINE

## 2023-11-03 NOTE — PROGRESS NOTES
New Clinic Note    Patient Name:  Khalida Leos is a 73 y.o. female     Chief Complaint:    Chief Complaint   Patient presents with    Hospital Follow Up     Patient is here to follow up after being admitted to [facility] from 10/30-11/1. She states Dr. Whitman sent her to the hospital from his clinic because she had elevated blood pressure and cellulitis left foot. She states the foot is better. She was advised to follow up with PCP to have her PT/INR checked today. She doesn't know if her dose was changed in the hospital and did not bring medicines with her today.     did not bring meds        Subjective  HPI         Current Outpatient Medications:     acetaminophen (TYLENOL) 650 MG TbSR, Take 650 mg by mouth every 8 (eight) hours., Disp: , Rfl:     carvediloL (COREG) 6.25 MG tablet, Take 6.25 mg by mouth 2 (two) times daily with meals., Disp: , Rfl:     chlorthalidone (HYGROTEN) 25 MG Tab, Take 25 mg by mouth once daily., Disp: , Rfl:     clopidogreL (PLAVIX) 75 mg tablet, Take 1 tablet (75 mg total) by mouth once daily., Disp: 30 tablet, Rfl: 11    collagenase (SANTYL) ointment, Apply topically once daily., Disp: 30 g, Rfl: 0    collagenase (SANTYL) ointment, Apply topically once daily., Disp: 30 g, Rfl: 0    diltiaZEM HCl (TIAZAC) 120 mg 24 hr capsule, Take 120 mg by mouth once daily., Disp: , Rfl:     losartan (COZAAR) 50 MG tablet, Take 50 mg by mouth once daily., Disp: , Rfl:     metoprolol tartrate (LOPRESSOR) 50 MG tablet, Take 50 mg by mouth 2 (two) times daily., Disp: , Rfl:     warfarin (COUMADIN) 7.5 MG tablet, Take 7.5 mg by mouth Daily., Disp: , Rfl:    Past Medical History:   Diagnosis Date    History of mitral valve replacement     Hypertension       Past Surgical History:   Procedure Laterality Date    FLUOROSCOPIC ANGIOGRAPHY OF LOWER EXTREMITY WITH TOPICAL ULTRASOUND Left 7/7/2023    Procedure: ARTERIOGRAM-LEG AND ULTRASOUND;  Surgeon: Zara Zaldivar MD;  Location: [facility];  Service: General;   "Laterality: Left;    MITRAL VALVE REPLACEMENT      PTA, ARTERY, PERIPHERAL Left 7/7/2023    Procedure: PTA, ARTERY, PERIPHERAL;  Surgeon: Zara Zaldivar MD;  Location: [facility];  Service: General;  Laterality: Left;  balloon      Family History   Problem Relation Age of Onset    Heart disease Mother     Cancer Father       Social History     Tobacco Use    Smoking status: Every Day     Types: Cigarettes     Passive exposure: Current    Smokeless tobacco: Never   Substance Use Topics    Alcohol use: Never        Review of Systems   Constitutional:  Negative for fatigue and fever.   HENT:  Negative for nasal congestion, rhinorrhea and sore throat.    Eyes:  Negative for visual disturbance.   Respiratory:  Negative for cough, chest tightness, shortness of breath and wheezing.    Cardiovascular:  Negative for chest pain, palpitations and leg swelling.   Gastrointestinal:  Negative for abdominal pain, blood in stool, nausea and vomiting.   Genitourinary:  Negative for dysuria and hematuria.   Musculoskeletal:  Negative for back pain and neck pain.   Integumentary:  Negative for rash and mole/lesion.   Neurological:  Negative for dizziness, vertigo, headaches and memory loss.   Hematological:  Negative for adenopathy.   Psychiatric/Behavioral:  Negative for confusion. The patient is not nervous/anxious.         Objective:  /72 (BP Location: Left arm, Patient Position: Sitting)   Pulse 98   Temp 97.4 °F (36.3 °C) (Oral)   Resp 16   Ht 5' 4" (1.626 m)   Wt 54.9 kg (121 lb)   SpO2 100%   BMI 20.77 kg/m²      Physical Exam  Constitutional:       Appearance: Normal appearance.   HENT:      Head: Normocephalic and atraumatic.      Right Ear: External ear normal.      Left Ear: External ear normal.      Nose: Nose normal.   Eyes:      Extraocular Movements: Extraocular movements intact.      Conjunctiva/sclera: Conjunctivae normal.      Pupils: Pupils are equal, round, and reactive to light.   Cardiovascular: "      Rate and Rhythm: Normal rate. Rhythm irregular.      Pulses: Normal pulses.      Heart sounds: Murmur (3/6) heard.      No friction rub. No gallop.   Pulmonary:      Effort: Pulmonary effort is normal.      Breath sounds: No wheezing, rhonchi or rales.   Abdominal:      General: Bowel sounds are normal.      Palpations: There is no mass.      Tenderness: There is no abdominal tenderness.   Musculoskeletal:         General: No swelling.      Cervical back: Normal range of motion and neck supple.   Skin:     General: Skin is warm.      Findings: No lesion or rash.   Neurological:      General: No focal deficit present.      Mental Status: She is alert and oriented to person, place, and time.   Psychiatric:         Mood and Affect: Mood normal.          Assessment and Plan    Essential hypertension, benign    Long term current use of anticoagulant  -     POCT PT/INR    Nonischemic congestive cardiomyopathy    Longstanding persistent atrial fibrillation    PVD (peripheral vascular disease)    Cellulitis of left leg         Problem List Items Addressed This Visit          Cardiac/Vascular    Nonischemic congestive cardiomyopathy (Chronic)    Essential hypertension, benign - Primary (Chronic)    Atrial fibrillation    PVD (peripheral vascular disease)     Other Visit Diagnoses       Long term current use of anticoagulant        Relevant Orders    POCT PT/INR    Cellulitis of left leg               I have not seen the patient in over 1 1/2 years.  I had a long discussion about keeping f/u appts and taking meds.  She states she has not been taking coumadin regularly much less getting INR checked.  She can stop her Lovenox since her INR is 2.0 today  1-A-fib and presence of mechanical mitral valve-recheck INR in 2 weeks to see where her INR is actually on stable meds  2-Pulmonary HTN-take meds, keep appt  3-PVD-sees Dr. Zaldivar  1-Aqnoatcarm-BMZ resolved  5-HTN stable  6-COPD per outside records    Follow up in about 3  months (around 2/3/2024).

## 2023-11-09 DIAGNOSIS — Z71.89 COMPLEX CARE COORDINATION: ICD-10-CM

## 2023-11-17 ENCOUNTER — OFFICE VISIT (OUTPATIENT)
Dept: FAMILY MEDICINE | Facility: CLINIC | Age: 73
End: 2023-11-17
Payer: MEDICARE

## 2023-11-17 VITALS
TEMPERATURE: 98 F | DIASTOLIC BLOOD PRESSURE: 77 MMHG | HEART RATE: 108 BPM | OXYGEN SATURATION: 98 % | HEIGHT: 64 IN | SYSTOLIC BLOOD PRESSURE: 176 MMHG | BODY MASS INDEX: 20.63 KG/M2 | RESPIRATION RATE: 16 BRPM | WEIGHT: 120.81 LBS

## 2023-11-17 DIAGNOSIS — I10 ESSENTIAL HYPERTENSION, BENIGN: Chronic | ICD-10-CM

## 2023-11-17 DIAGNOSIS — I73.9 PVD (PERIPHERAL VASCULAR DISEASE): ICD-10-CM

## 2023-11-17 DIAGNOSIS — T45.511A POISONING BY WARFARIN SODIUM, ACCIDENTAL OR UNINTENTIONAL, INITIAL ENCOUNTER: Primary | ICD-10-CM

## 2023-11-17 DIAGNOSIS — Z79.01 LONG TERM CURRENT USE OF ANTICOAGULANT: ICD-10-CM

## 2023-11-17 DIAGNOSIS — Z91.148 NONCOMPLIANCE WITH MEDICATION REGIMEN: ICD-10-CM

## 2023-11-17 LAB
CTP QC/QA: YES
INR POC: 8 (ref 0–3.3)
PT, POC: 96 (ref 12–14.7)

## 2023-11-17 PROCEDURE — 99214 PR OFFICE/OUTPT VISIT, EST, LEVL IV, 30-39 MIN: ICD-10-PCS | Mod: ,,, | Performed by: INTERNAL MEDICINE

## 2023-11-17 PROCEDURE — 99214 OFFICE O/P EST MOD 30 MIN: CPT | Mod: ,,, | Performed by: INTERNAL MEDICINE

## 2023-11-17 PROCEDURE — 85610 PROTHROMBIN TIME: CPT | Mod: RHCUB | Performed by: INTERNAL MEDICINE

## 2023-11-17 RX ORDER — WARFARIN 10 MG/1
TABLET ORAL
COMMUNITY
End: 2024-01-08 | Stop reason: SDUPTHER

## 2023-11-17 NOTE — PROGRESS NOTES
"New Clinic Note    Patient Name:  Khalida Leos is a 73 y.o. female     Chief Complaint:    Chief Complaint   Patient presents with    Leg Pain     Patient has redness, seeping drainage, and tenderness to both legs, stating left leg is worse. Dr. Zaldivar placed stents her left leg in July 2023. She states the legs look better than they did, but they've bothered her for "a while." She states she has not contacted his office.     Did not bring meds        Subjective  Leg Pain              Current Outpatient Medications:     acetaminophen (TYLENOL) 650 MG TbSR, Take 650 mg by mouth every 8 (eight) hours., Disp: , Rfl:     carvediloL (COREG) 6.25 MG tablet, Take 6.25 mg by mouth 2 (two) times daily with meals., Disp: , Rfl:     chlorthalidone (HYGROTEN) 25 MG Tab, Take 25 mg by mouth once daily., Disp: , Rfl:     clopidogreL (PLAVIX) 75 mg tablet, Take 1 tablet (75 mg total) by mouth once daily., Disp: 30 tablet, Rfl: 11    collagenase (SANTYL) ointment, Apply topically once daily., Disp: 30 g, Rfl: 0    collagenase (SANTYL) ointment, Apply topically once daily., Disp: 30 g, Rfl: 0    diltiaZEM HCl (TIAZAC) 120 mg 24 hr capsule, Take 120 mg by mouth once daily., Disp: , Rfl:     losartan (COZAAR) 50 MG tablet, Take 50 mg by mouth once daily., Disp: , Rfl:     metoprolol tartrate (LOPRESSOR) 50 MG tablet, Take 50 mg by mouth 2 (two) times daily., Disp: , Rfl:     warfarin (COUMADIN) 10 MG tablet, Take 10 mg by mouth 2 (two) times a day., Disp: , Rfl:    Past Medical History:   Diagnosis Date    History of mitral valve replacement     Hypertension       Past Surgical History:   Procedure Laterality Date    FLUOROSCOPIC ANGIOGRAPHY OF LOWER EXTREMITY WITH TOPICAL ULTRASOUND Left 7/7/2023    Procedure: ARTERIOGRAM-LEG AND ULTRASOUND;  Surgeon: Zara Zaldivar MD;  Location: [facility];  Service: General;  Laterality: Left;    MITRAL VALVE REPLACEMENT      PTA, ARTERY, PERIPHERAL Left 7/7/2023    Procedure: PTA, ARTERY, " "PERIPHERAL;  Surgeon: Zara Zaldivar MD;  Location: [facility] OR;  Service: General;  Laterality: Left;  balloon      Family History   Problem Relation Age of Onset    Heart disease Mother     Cancer Father       Social History     Tobacco Use    Smoking status: Every Day     Types: Cigarettes     Passive exposure: Current    Smokeless tobacco: Never   Substance Use Topics    Alcohol use: Never        Review of Systems   Constitutional:  Negative for fatigue and fever.   HENT:  Negative for nasal congestion, rhinorrhea and sore throat.    Eyes:  Negative for visual disturbance.   Respiratory:  Negative for cough, chest tightness, shortness of breath and wheezing.    Cardiovascular:  Negative for chest pain, palpitations and leg swelling.   Gastrointestinal:  Negative for abdominal pain, blood in stool, nausea and vomiting.   Genitourinary:  Negative for dysuria and hematuria.   Musculoskeletal:  Positive for leg pain. Negative for back pain and neck pain.   Integumentary:  Negative for rash and mole/lesion.   Neurological:  Negative for dizziness, vertigo, headaches and memory loss.   Hematological:  Negative for adenopathy.   Psychiatric/Behavioral:  Negative for confusion. The patient is not nervous/anxious.         Objective:  BP (!) 176/77 (BP Location: Left arm, Patient Position: Sitting)   Pulse 108   Temp 98.3 °F (36.8 °C) (Oral)   Resp 16   Ht 5' 4" (1.626 m)   Wt 54.8 kg (120 lb 12.8 oz)   SpO2 98%   BMI 20.74 kg/m²      Physical Exam  Constitutional:       Appearance: Normal appearance.   HENT:      Head: Normocephalic and atraumatic.      Right Ear: External ear normal.      Left Ear: External ear normal.      Nose: Nose normal.   Eyes:      Extraocular Movements: Extraocular movements intact.      Conjunctiva/sclera: Conjunctivae normal.      Pupils: Pupils are equal, round, and reactive to light.   Cardiovascular:      Rate and Rhythm: Normal rate and regular rhythm.      Pulses: Normal pulses. "      Heart sounds: Murmur heard.   Pulmonary:      Effort: Pulmonary effort is normal.      Breath sounds: No wheezing, rhonchi or rales.   Abdominal:      General: Bowel sounds are normal.      Palpations: There is no mass.      Tenderness: There is no abdominal tenderness.   Musculoskeletal:         General: No swelling.      Cervical back: Normal range of motion and neck supple.   Skin:     General: Skin is warm.      Findings: No lesion or rash.      Comments: Excoriations to LLE, no sign of infection today   Neurological:      General: No focal deficit present.      Mental Status: She is alert and oriented to person, place, and time.   Psychiatric:         Mood and Affect: Mood normal.          Assessment and Plan    Poisoning by warfarin sodium, accidental or unintentional, initial encounter    Long term current use of anticoagulant  -     POCT PT/INR    Essential hypertension, benign    PVD (peripheral vascular disease)    Noncompliance with medication regimen         Problem List Items Addressed This Visit          Cardiac/Vascular    Essential hypertension, benign (Chronic)    PVD (peripheral vascular disease)     Other Visit Diagnoses       Poisoning by warfarin sodium, accidental or unintentional, initial encounter    -  Primary    Long term current use of anticoagulant        Relevant Orders    POCT PT/INR (Completed)    Noncompliance with medication regimen               She is now taking coumadin 10mg bid-DID NOT bring meds, her paper instructions do not say to do this.  Hold coumadin until Monday and recheck INR then.  If she develops bleeding over the weekend go to the ED.  She states she understands this.    We put a dressing on her leg, there are excoriations but no sign of infection today.    I will recheck her in 3 days  HTN -up today-non compliance is her biggest issue  Follow up in about 3 days (around 11/20/2023).

## 2023-11-20 ENCOUNTER — OFFICE VISIT (OUTPATIENT)
Dept: FAMILY MEDICINE | Facility: CLINIC | Age: 73
End: 2023-11-20
Payer: MEDICARE

## 2023-11-20 VITALS
TEMPERATURE: 98 F | BODY MASS INDEX: 20.49 KG/M2 | RESPIRATION RATE: 16 BRPM | SYSTOLIC BLOOD PRESSURE: 168 MMHG | HEART RATE: 96 BPM | OXYGEN SATURATION: 100 % | WEIGHT: 120 LBS | HEIGHT: 64 IN | DIASTOLIC BLOOD PRESSURE: 84 MMHG

## 2023-11-20 DIAGNOSIS — I48.91 ATRIAL FIBRILLATION, UNSPECIFIED TYPE: Primary | ICD-10-CM

## 2023-11-20 DIAGNOSIS — I10 ESSENTIAL HYPERTENSION, BENIGN: ICD-10-CM

## 2023-11-20 DIAGNOSIS — Z79.01 LONG TERM CURRENT USE OF ANTICOAGULANT: ICD-10-CM

## 2023-11-20 DIAGNOSIS — I73.9 PVD (PERIPHERAL VASCULAR DISEASE): ICD-10-CM

## 2023-11-20 LAB
CTP QC/QA: YES
INR POC: 3 (ref 0–3.3)
PT, POC: 36.5 (ref 12–14.7)

## 2023-11-20 PROCEDURE — 99213 PR OFFICE/OUTPT VISIT, EST, LEVL III, 20-29 MIN: ICD-10-PCS | Mod: ,,, | Performed by: INTERNAL MEDICINE

## 2023-11-20 PROCEDURE — 85610 PROTHROMBIN TIME: CPT | Mod: RHCUB | Performed by: INTERNAL MEDICINE

## 2023-11-20 PROCEDURE — 99213 OFFICE O/P EST LOW 20 MIN: CPT | Mod: ,,, | Performed by: INTERNAL MEDICINE

## 2023-11-20 RX ORDER — AMLODIPINE BESYLATE 5 MG/1
5 TABLET ORAL DAILY
COMMUNITY
End: 2024-01-11 | Stop reason: SDUPTHER

## 2023-11-20 NOTE — PROGRESS NOTES
New Clinic Note    Patient Name:  Khalida Leos is a 73 y.o. female     Chief Complaint:    Chief Complaint   Patient presents with    Follow-up     Patient is here to follow up on INR today. She brought her medicine bottles today, so med list is correct, according to bottles. She did bring two bottles of Warfarin 10mg. One bottle says to take one daily. The other bottle says to take 10mg daily on TT and 7.5mg on all other days. She doesn't have 7.5mg tablets, though-only 10mg. She's been holding Warfarin since last Friday, as directed.         Subjective  Follow-up  Pertinent negatives include no abdominal pain, chest pain, congestion, coughing, fatigue, fever, headaches, nausea, neck pain, rash, sore throat, vertigo or vomiting.            Current Outpatient Medications:     amLODIPine (NORVASC) 5 MG tablet, Take 5 mg by mouth once daily., Disp: , Rfl:     clopidogreL (PLAVIX) 75 mg tablet, Take 1 tablet (75 mg total) by mouth once daily., Disp: 30 tablet, Rfl: 11    warfarin (COUMADIN) 10 MG tablet, Take 10 mg by mouth Daily., Disp: , Rfl:    Past Medical History:   Diagnosis Date    History of mitral valve replacement     Hypertension       Past Surgical History:   Procedure Laterality Date    FLUOROSCOPIC ANGIOGRAPHY OF LOWER EXTREMITY WITH TOPICAL ULTRASOUND Left 7/7/2023    Procedure: ARTERIOGRAM-LEG AND ULTRASOUND;  Surgeon: Zara Zaldivar MD;  Location: [facility] OR;  Service: General;  Laterality: Left;    MITRAL VALVE REPLACEMENT      PTA, ARTERY, PERIPHERAL Left 7/7/2023    Procedure: PTA, ARTERY, PERIPHERAL;  Surgeon: Zara Zaldivar MD;  Location: [facility] OR;  Service: General;  Laterality: Left;  balloon      Family History   Problem Relation Age of Onset    Heart disease Mother     Cancer Father       Social History     Tobacco Use    Smoking status: Every Day     Types: Cigarettes     Passive exposure: Current    Smokeless tobacco: Never   Substance Use Topics    Alcohol use: Never        Review  "of Systems   Constitutional:  Negative for fatigue and fever.   HENT:  Negative for nasal congestion, rhinorrhea and sore throat.    Eyes:  Negative for visual disturbance.   Respiratory:  Negative for cough, chest tightness, shortness of breath and wheezing.    Cardiovascular:  Negative for chest pain, palpitations and leg swelling.   Gastrointestinal:  Negative for abdominal pain, blood in stool, nausea and vomiting.   Genitourinary:  Negative for dysuria and hematuria.   Musculoskeletal:  Negative for back pain and neck pain.   Integumentary:  Negative for rash and mole/lesion.   Neurological:  Negative for dizziness, vertigo, headaches and memory loss.   Hematological:  Negative for adenopathy.   Psychiatric/Behavioral:  Negative for confusion. The patient is not nervous/anxious.         Objective:  BP (!) 168/84 (BP Location: Left arm, Patient Position: Sitting)   Pulse 96   Temp 98.1 °F (36.7 °C) (Oral)   Resp 16   Ht 5' 4" (1.626 m)   Wt 54.4 kg (120 lb)   SpO2 100%   BMI 20.60 kg/m²      Physical Exam  Constitutional:       Appearance: Normal appearance.   HENT:      Head: Normocephalic and atraumatic.      Right Ear: External ear normal.      Left Ear: External ear normal.      Nose: Nose normal.   Eyes:      Extraocular Movements: Extraocular movements intact.      Conjunctiva/sclera: Conjunctivae normal.      Pupils: Pupils are equal, round, and reactive to light.   Cardiovascular:      Rate and Rhythm: Normal rate and regular rhythm.      Pulses: Normal pulses.      Heart sounds: Murmur heard.      No friction rub. No gallop.   Pulmonary:      Effort: Pulmonary effort is normal.      Breath sounds: No wheezing, rhonchi or rales.   Abdominal:      General: Bowel sounds are normal.      Palpations: There is no mass.      Tenderness: There is no abdominal tenderness.   Musculoskeletal:         General: No swelling.      Cervical back: Normal range of motion and neck supple.   Skin:     General: Skin " is warm.      Findings: No lesion or rash.   Neurological:      General: No focal deficit present.      Mental Status: She is alert and oriented to person, place, and time.   Psychiatric:         Mood and Affect: Mood normal.          Assessment and Plan    Atrial fibrillation, unspecified type    Long term current use of anticoagulant  -     POCT PT/INR    Essential hypertension, benign    PVD (peripheral vascular disease)         Problem List Items Addressed This Visit          Cardiac/Vascular    Essential hypertension, benign (Chronic)    Atrial fibrillation - Primary    PVD (peripheral vascular disease)     Other Visit Diagnoses       Long term current use of anticoagulant        Relevant Orders    POCT PT/INR (Completed)           1-INR is 3.0 today, start taking coumadin 5mg qd in 2 days then recheck INR Wednesday next week  2-A-fib rated controlled  3-HTN-just started taking norvasc-see how bp does  4-PVD-leg is looking better    Follow up in about 3 months (around 2/20/2024).

## 2023-11-30 ENCOUNTER — LAB VISIT (OUTPATIENT)
Dept: LAB | Facility: CLINIC | Age: 73
End: 2023-11-30
Payer: MEDICARE

## 2023-11-30 DIAGNOSIS — I48.11 LONGSTANDING PERSISTENT ATRIAL FIBRILLATION: Primary | Chronic | ICD-10-CM

## 2023-11-30 LAB
CTP QC/QA: YES
INR POC: 1.6 (ref 0–3.3)
PT, POC: 19 (ref 12–14.7)

## 2023-11-30 PROCEDURE — 85610 PROTHROMBIN TIME: CPT | Mod: RHCUB

## 2024-01-08 ENCOUNTER — OFFICE VISIT (OUTPATIENT)
Dept: FAMILY MEDICINE | Facility: CLINIC | Age: 74
End: 2024-01-08
Payer: MEDICARE

## 2024-01-08 VITALS
HEART RATE: 108 BPM | HEIGHT: 64 IN | TEMPERATURE: 98 F | SYSTOLIC BLOOD PRESSURE: 182 MMHG | RESPIRATION RATE: 16 BRPM | WEIGHT: 125 LBS | OXYGEN SATURATION: 100 % | DIASTOLIC BLOOD PRESSURE: 80 MMHG | BODY MASS INDEX: 21.34 KG/M2

## 2024-01-08 DIAGNOSIS — L03.116 CELLULITIS OF LEFT LOWER EXTREMITY: Primary | ICD-10-CM

## 2024-01-08 DIAGNOSIS — I73.9 PVD (PERIPHERAL VASCULAR DISEASE): ICD-10-CM

## 2024-01-08 DIAGNOSIS — I42.0 NONISCHEMIC CONGESTIVE CARDIOMYOPATHY: ICD-10-CM

## 2024-01-08 DIAGNOSIS — Z95.2 H/O MITRAL VALVE REPLACEMENT: ICD-10-CM

## 2024-01-08 DIAGNOSIS — H34.8312 TRIBUTARY (BRANCH) RETINAL VEIN OCCLUSION, RIGHT EYE, STABLE: ICD-10-CM

## 2024-01-08 DIAGNOSIS — L20.84 INTRINSIC ECZEMA: ICD-10-CM

## 2024-01-08 DIAGNOSIS — J43.1 PANLOBULAR EMPHYSEMA: ICD-10-CM

## 2024-01-08 DIAGNOSIS — I27.20 PULMONARY HYPERTENSION: ICD-10-CM

## 2024-01-08 DIAGNOSIS — Z79.01 LONG TERM CURRENT USE OF ANTICOAGULANT: ICD-10-CM

## 2024-01-08 DIAGNOSIS — I48.91 ATRIAL FIBRILLATION, UNSPECIFIED TYPE: ICD-10-CM

## 2024-01-08 LAB
ANION GAP SERPL CALCULATED.3IONS-SCNC: 10 MMOL/L (ref 7–16)
BUN SERPL-MCNC: 11 MG/DL (ref 7–18)
BUN/CREAT SERPL: 16 (ref 6–20)
CALCIUM SERPL-MCNC: 9.1 MG/DL (ref 8.5–10.1)
CHLORIDE SERPL-SCNC: 107 MMOL/L (ref 98–107)
CO2 SERPL-SCNC: 28 MMOL/L (ref 21–32)
CREAT SERPL-MCNC: 0.7 MG/DL (ref 0.55–1.02)
EGFR (NO RACE VARIABLE) (RUSH/TITUS): 91 ML/MIN/1.73M2
GLUCOSE SERPL-MCNC: 110 MG/DL (ref 74–106)
INR BLD: 1.47
POTASSIUM SERPL-SCNC: 3.4 MMOL/L (ref 3.5–5.1)
PROTHROMBIN TIME: 17.6 SECONDS (ref 11.7–14.7)
SODIUM SERPL-SCNC: 142 MMOL/L (ref 136–145)

## 2024-01-08 PROCEDURE — 80048 BASIC METABOLIC PNL TOTAL CA: CPT | Mod: ,,, | Performed by: CLINICAL MEDICAL LABORATORY

## 2024-01-08 PROCEDURE — 99214 OFFICE O/P EST MOD 30 MIN: CPT | Mod: ,,, | Performed by: INTERNAL MEDICINE

## 2024-01-08 PROCEDURE — 96372 THER/PROPH/DIAG INJ SC/IM: CPT | Mod: ,,, | Performed by: INTERNAL MEDICINE

## 2024-01-08 RX ORDER — DEXAMETHASONE SODIUM PHOSPHATE 4 MG/ML
4 INJECTION, SOLUTION INTRA-ARTICULAR; INTRALESIONAL; INTRAMUSCULAR; INTRAVENOUS; SOFT TISSUE
Status: COMPLETED | OUTPATIENT
Start: 2024-01-08 | End: 2024-01-08

## 2024-01-08 RX ORDER — FUROSEMIDE 20 MG/1
20 TABLET ORAL DAILY
Qty: 30 TABLET | Refills: 5 | Status: SHIPPED | OUTPATIENT
Start: 2024-01-08 | End: 2024-02-02 | Stop reason: SDUPTHER

## 2024-01-08 RX ORDER — TRIAMCINOLONE ACETONIDE 1 MG/G
OINTMENT TOPICAL 2 TIMES DAILY
Qty: 30 G | Refills: 5 | Status: SHIPPED | OUTPATIENT
Start: 2024-01-08

## 2024-01-08 RX ORDER — WARFARIN SODIUM 5 MG/1
TABLET ORAL
Qty: 90 TABLET | Refills: 5 | Status: SHIPPED | OUTPATIENT
Start: 2024-01-08

## 2024-01-08 RX ORDER — CEPHALEXIN 500 MG/1
1000 CAPSULE ORAL EVERY 12 HOURS
Qty: 28 CAPSULE | Refills: 0 | Status: SHIPPED | OUTPATIENT
Start: 2024-01-08 | End: 2024-02-02

## 2024-01-08 RX ORDER — WARFARIN 10 MG/1
TABLET ORAL
Qty: 90 TABLET | Refills: 1 | Status: SHIPPED | OUTPATIENT
Start: 2024-01-08 | End: 2024-01-08

## 2024-01-08 RX ORDER — METHYLPREDNISOLONE ACETATE 40 MG/ML
40 INJECTION, SUSPENSION INTRA-ARTICULAR; INTRALESIONAL; INTRAMUSCULAR; SOFT TISSUE
Status: COMPLETED | OUTPATIENT
Start: 2024-01-08 | End: 2024-01-08

## 2024-01-08 RX ADMIN — METHYLPREDNISOLONE ACETATE 40 MG: 40 INJECTION, SUSPENSION INTRA-ARTICULAR; INTRALESIONAL; INTRAMUSCULAR; SOFT TISSUE at 03:01

## 2024-01-08 RX ADMIN — DEXAMETHASONE SODIUM PHOSPHATE 4 MG: 4 INJECTION, SOLUTION INTRA-ARTICULAR; INTRALESIONAL; INTRAMUSCULAR; INTRAVENOUS; SOFT TISSUE at 03:01

## 2024-01-08 NOTE — PROGRESS NOTES
New Clinic Note    Patient Name:  Khalida Leos is a 73 y.o. female     Chief Complaint:    Chief Complaint   Patient presents with    Foot Pain     Patient reports pain and swelling in both feet (left is worse). She also has clear drainage from the left leg.     Did not bring meds    Hypertension     BP is high today. When going over meds, patient states she is not taking Amlodipine.         Subjective  Foot Pain  Pertinent negatives include no abdominal pain, chest pain, congestion, coughing, fatigue, fever, headaches, nausea, neck pain, rash, sore throat, vertigo or vomiting.   Hypertension  Pertinent negatives include no chest pain, headaches, neck pain, palpitations or shortness of breath.            Current Outpatient Medications:     amLODIPine (NORVASC) 5 MG tablet, Take 5 mg by mouth once daily., Disp: , Rfl:     cephALEXin (KEFLEX) 500 MG capsule, Take 2 capsules (1,000 mg total) by mouth every 12 (twelve) hours., Disp: 28 capsule, Rfl: 0    clopidogreL (PLAVIX) 75 mg tablet, Take 1 tablet (75 mg total) by mouth once daily., Disp: 30 tablet, Rfl: 11    furosemide (LASIX) 20 MG tablet, Take 1 tablet (20 mg total) by mouth once daily., Disp: 30 tablet, Rfl: 5    triamcinolone acetonide 0.1% (KENALOG) 0.1 % ointment, Apply topically 2 (two) times daily., Disp: 30 g, Rfl: 5  No current facility-administered medications for this visit.   Past Medical History:   Diagnosis Date    History of mitral valve replacement     Hypertension       Past Surgical History:   Procedure Laterality Date    FLUOROSCOPIC ANGIOGRAPHY OF LOWER EXTREMITY WITH TOPICAL ULTRASOUND Left 7/7/2023    Procedure: ARTERIOGRAM-LEG AND ULTRASOUND;  Surgeon: Zara Zaldivar MD;  Location: [facility] OR;  Service: General;  Laterality: Left;    MITRAL VALVE REPLACEMENT      PTA, ARTERY, PERIPHERAL Left 7/7/2023    Procedure: PTA, ARTERY, PERIPHERAL;  Surgeon: Zara Zaldivar MD;  Location: [facility] OR;  Service: General;  Laterality: Left;   "balloon      Family History   Problem Relation Age of Onset    Heart disease Mother     Cancer Father       Social History     Tobacco Use    Smoking status: Every Day     Types: Cigarettes     Passive exposure: Current    Smokeless tobacco: Never   Substance Use Topics    Alcohol use: Never        Review of Systems   Constitutional:  Negative for fatigue and fever.   HENT:  Negative for nasal congestion, rhinorrhea and sore throat.    Eyes:  Negative for visual disturbance.   Respiratory:  Negative for cough, chest tightness, shortness of breath and wheezing.    Cardiovascular:  Negative for chest pain, palpitations and leg swelling.   Gastrointestinal:  Negative for abdominal pain, blood in stool, nausea and vomiting.   Genitourinary:  Negative for dysuria and hematuria.   Musculoskeletal:  Positive for leg pain. Negative for back pain and neck pain.   Integumentary:  Positive for wound. Negative for rash and mole/lesion.   Neurological:  Negative for dizziness, vertigo, headaches and memory loss.   Hematological:  Negative for adenopathy.   Psychiatric/Behavioral:  Negative for confusion. The patient is not nervous/anxious.         Objective:  BP (!) 182/80 (BP Location: Left arm, Patient Position: Sitting)   Pulse 108   Temp 98.3 °F (36.8 °C) (Oral)   Resp 16   Ht 5' 4" (1.626 m)   Wt 56.7 kg (125 lb)   SpO2 100%   BMI 21.46 kg/m²      Physical Exam  Constitutional:       Appearance: Normal appearance.   HENT:      Head: Normocephalic and atraumatic.      Nose: Nose normal.   Eyes:      Extraocular Movements: Extraocular movements intact.      Pupils: Pupils are equal, round, and reactive to light.   Cardiovascular:      Rate and Rhythm: Normal rate. Rhythm irregular.   Pulmonary:      Effort: Pulmonary effort is normal.      Breath sounds: Normal breath sounds.   Musculoskeletal:      Cervical back: Normal range of motion.   Skin:     Findings: Rash (see a/p) present.   Neurological:      Mental Status: " She is alert and oriented to person, place, and time.          Assessment and Plan    Cellulitis of left lower extremity  -     cephALEXin (KEFLEX) 500 MG capsule; Take 2 capsules (1,000 mg total) by mouth every 12 (twelve) hours.  Dispense: 28 capsule; Refill: 0    Long term current use of anticoagulant  -     POCT PT/INR  -     Discontinue: warfarin (COUMADIN) 10 MG tablet; Take one whole tablet by mouth on Monday and Friday. Take one-half tablet by mouth on Tuesday, Wednesday, Thursday, Saturday, and Sunday  Dispense: 90 tablet; Refill: 1    PVD (peripheral vascular disease)  -     Basic metabolic panel  -     Protime-INR  -     furosemide (LASIX) 20 MG tablet; Take 1 tablet (20 mg total) by mouth once daily.  Dispense: 30 tablet; Refill: 5    H/O mitral valve replacement  -     Basic metabolic panel  -     Protime-INR    Intrinsic eczema  -     dexAMETHasone injection 4 mg  -     methylPREDNISolone acetate injection 40 mg  -     triamcinolone acetonide 0.1% (KENALOG) 0.1 % ointment; Apply topically 2 (two) times daily.  Dispense: 30 g; Refill: 5    Pulmonary hypertension    Tributary (branch) retinal vein occlusion, right eye, stable    Panlobular emphysema    Atrial fibrillation, unspecified type    Nonischemic congestive cardiomyopathy         Problem List Items Addressed This Visit          Ophtho    Tributary (branch) retinal vein occlusion, right eye, stable       Pulmonary    Panlobular emphysema       Cardiac/Vascular    Nonischemic congestive cardiomyopathy (Chronic)    Atrial fibrillation    PVD (peripheral vascular disease)    Relevant Medications    furosemide (LASIX) 20 MG tablet    Pulmonary hypertension     Other Visit Diagnoses       Cellulitis of left lower extremity    -  Primary    Relevant Medications    cephALEXin (KEFLEX) 500 MG capsule    Long term current use of anticoagulant        Relevant Orders    POCT PT/INR    H/O mitral valve replacement        Intrinsic eczema        Relevant  Medications    dexAMETHasone injection 4 mg (Completed)    methylPREDNISolone acetate injection 40 mg (Completed)    triamcinolone acetonide 0.1% (KENALOG) 0.1 % ointment         Dry red crackly skin with breakdown mid shin to ankle-4/40, TCM 0.1 qd, keflex and daily lasix for now  HTN up today-will rehceck at next visit in 3 days  CHF stable  PVD may be somewhat related to this too but will treat as superficial today, the foot looks ok  A fib-cont coumadin    Follow up in about 3 days (around 1/11/2024).

## 2024-01-11 ENCOUNTER — OFFICE VISIT (OUTPATIENT)
Dept: FAMILY MEDICINE | Facility: CLINIC | Age: 74
End: 2024-01-11
Payer: MEDICARE

## 2024-01-11 VITALS
TEMPERATURE: 98 F | BODY MASS INDEX: 21.03 KG/M2 | HEART RATE: 92 BPM | SYSTOLIC BLOOD PRESSURE: 163 MMHG | OXYGEN SATURATION: 99 % | HEIGHT: 64 IN | RESPIRATION RATE: 16 BRPM | DIASTOLIC BLOOD PRESSURE: 91 MMHG | WEIGHT: 123.19 LBS

## 2024-01-11 DIAGNOSIS — L03.116 CELLULITIS OF LEFT LOWER EXTREMITY: Primary | ICD-10-CM

## 2024-01-11 DIAGNOSIS — I48.11 LONGSTANDING PERSISTENT ATRIAL FIBRILLATION: ICD-10-CM

## 2024-01-11 DIAGNOSIS — I10 ESSENTIAL HYPERTENSION, BENIGN: Chronic | ICD-10-CM

## 2024-01-11 PROCEDURE — 99214 OFFICE O/P EST MOD 30 MIN: CPT | Mod: ,,, | Performed by: INTERNAL MEDICINE

## 2024-01-11 RX ORDER — AMLODIPINE BESYLATE 5 MG/1
5 TABLET ORAL DAILY
Qty: 90 TABLET | Refills: 1 | Status: SHIPPED | OUTPATIENT
Start: 2024-01-11

## 2024-01-11 NOTE — PROGRESS NOTES
New Clinic Note    Patient Name:  Khalida Leos is a 73 y.o. female     Chief Complaint:    Chief Complaint   Patient presents with    Wound Check     Patient is here to follow up on cellulitis to her legs. She's taking her ABX as prescribed and states the wounds are improving.     Hypertension     At last office visit, it was mentioned that she did not have Norvasc at home. She still did not bring it in today and she is not sure she's taking it. If she needs to take this, she needs a refill. She's not sure when she last took Norvasc.         Subjective  Wound Check    Hypertension  Pertinent negatives include no chest pain, headaches, neck pain, palpitations or shortness of breath.            Current Outpatient Medications:     furosemide (LASIX) 20 MG tablet, Take 1 tablet (20 mg total) by mouth once daily., Disp: 30 tablet, Rfl: 5    warfarin (COUMADIN) 5 MG tablet, Take 2 tablets by mouth on Monday and Friday, and 1 tablet by mouth all other days., Disp: 90 tablet, Rfl: 5    amLODIPine (NORVASC) 5 MG tablet, Take 1 tablet (5 mg total) by mouth once daily., Disp: 90 tablet, Rfl: 1    cephALEXin (KEFLEX) 500 MG capsule, Take 2 capsules (1,000 mg total) by mouth every 12 (twelve) hours., Disp: 28 capsule, Rfl: 0    clopidogreL (PLAVIX) 75 mg tablet, Take 1 tablet (75 mg total) by mouth once daily., Disp: 30 tablet, Rfl: 11    triamcinolone acetonide 0.1% (KENALOG) 0.1 % ointment, Apply topically 2 (two) times daily., Disp: 30 g, Rfl: 5   Past Medical History:   Diagnosis Date    History of mitral valve replacement     Hypertension       Past Surgical History:   Procedure Laterality Date    FLUOROSCOPIC ANGIOGRAPHY OF LOWER EXTREMITY WITH TOPICAL ULTRASOUND Left 7/7/2023    Procedure: ARTERIOGRAM-LEG AND ULTRASOUND;  Surgeon: Zara Zaldivar MD;  Location: [facility];  Service: General;  Laterality: Left;    MITRAL VALVE REPLACEMENT      PTA, ARTERY, PERIPHERAL Left 7/7/2023    Procedure: PTA, ARTERY, PERIPHERAL;  " Surgeon: Zara Zaldivar MD;  Location: [facility];  Service: General;  Laterality: Left;  balloon      Family History   Problem Relation Age of Onset    Heart disease Mother     Cancer Father       Social History     Tobacco Use    Smoking status: Every Day     Types: Cigarettes     Passive exposure: Current    Smokeless tobacco: Never   Substance Use Topics    Alcohol use: Never        Review of Systems   Constitutional:  Negative for fatigue and fever.   HENT:  Negative for nasal congestion, rhinorrhea and sore throat.    Eyes:  Negative for visual disturbance.   Respiratory:  Negative for cough, chest tightness, shortness of breath and wheezing.    Cardiovascular:  Negative for chest pain, palpitations and leg swelling.   Gastrointestinal:  Negative for abdominal pain, blood in stool, nausea and vomiting.   Genitourinary:  Negative for dysuria and hematuria.   Musculoskeletal:  Negative for back pain and neck pain.   Integumentary:  Negative for rash and mole/lesion.   Neurological:  Negative for dizziness, vertigo, headaches and memory loss.   Hematological:  Negative for adenopathy.   Psychiatric/Behavioral:  Negative for confusion. The patient is not nervous/anxious.         Objective:  BP (!) 163/91 (BP Location: Left arm, Patient Position: Sitting)   Pulse 92   Temp 98.3 °F (36.8 °C) (Oral)   Resp 16   Ht 5' 4" (1.626 m)   Wt 55.9 kg (123 lb 3.2 oz)   SpO2 99%   BMI 21.15 kg/m²      Physical Exam  Constitutional:       Appearance: Normal appearance.   HENT:      Head: Normocephalic and atraumatic.      Right Ear: External ear normal.      Left Ear: External ear normal.      Nose: Nose normal.   Eyes:      Extraocular Movements: Extraocular movements intact.      Conjunctiva/sclera: Conjunctivae normal.      Pupils: Pupils are equal, round, and reactive to light.   Cardiovascular:      Rate and Rhythm: Normal rate and regular rhythm.      Pulses: Normal pulses.      Heart sounds: Normal heart " sounds. No murmur heard.     No friction rub. No gallop.   Pulmonary:      Effort: Pulmonary effort is normal.      Breath sounds: No wheezing, rhonchi or rales.   Abdominal:      General: Bowel sounds are normal.      Palpations: There is no mass.      Tenderness: There is no abdominal tenderness.   Musculoskeletal:         General: No swelling.      Cervical back: Normal range of motion and neck supple.   Skin:     General: Skin is warm.      Findings: No lesion or rash.      Comments: Cellulitis has resolved to LLE   Neurological:      General: No focal deficit present.      Mental Status: She is alert and oriented to person, place, and time.   Psychiatric:         Mood and Affect: Mood normal.          Assessment and Plan    Cellulitis of left lower extremity    Essential hypertension, benign  -     amLODIPine (NORVASC) 5 MG tablet; Take 1 tablet (5 mg total) by mouth once daily.  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

## 2024-02-02 ENCOUNTER — OFFICE VISIT (OUTPATIENT)
Dept: FAMILY MEDICINE | Facility: CLINIC | Age: 74
End: 2024-02-02
Payer: MEDICARE

## 2024-02-02 VITALS
SYSTOLIC BLOOD PRESSURE: 136 MMHG | TEMPERATURE: 98 F | DIASTOLIC BLOOD PRESSURE: 84 MMHG | BODY MASS INDEX: 20.66 KG/M2 | WEIGHT: 121 LBS | HEART RATE: 90 BPM | RESPIRATION RATE: 16 BRPM | HEIGHT: 64 IN | OXYGEN SATURATION: 99 %

## 2024-02-02 DIAGNOSIS — I73.9 PVD (PERIPHERAL VASCULAR DISEASE): ICD-10-CM

## 2024-02-02 DIAGNOSIS — Z79.01 LONG TERM CURRENT USE OF ANTICOAGULANT: ICD-10-CM

## 2024-02-02 DIAGNOSIS — I48.11 LONGSTANDING PERSISTENT ATRIAL FIBRILLATION: Chronic | ICD-10-CM

## 2024-02-02 DIAGNOSIS — I42.0 NONISCHEMIC CONGESTIVE CARDIOMYOPATHY: Chronic | ICD-10-CM

## 2024-02-02 DIAGNOSIS — I10 ESSENTIAL HYPERTENSION, BENIGN: Primary | Chronic | ICD-10-CM

## 2024-02-02 LAB
CTP QC/QA: YES
INR POC: 2.8 (ref 0–3.3)
PT, POC: 33.4 (ref 12–14.7)

## 2024-02-02 PROCEDURE — 99214 OFFICE O/P EST MOD 30 MIN: CPT | Mod: ,,, | Performed by: INTERNAL MEDICINE

## 2024-02-02 PROCEDURE — 85610 PROTHROMBIN TIME: CPT | Mod: RHCUB | Performed by: INTERNAL MEDICINE

## 2024-02-02 RX ORDER — FUROSEMIDE 20 MG/1
20 TABLET ORAL DAILY
Qty: 90 TABLET | Refills: 1 | Status: SHIPPED | OUTPATIENT
Start: 2024-02-02 | End: 2024-07-31

## 2024-02-02 NOTE — PROGRESS NOTES
New Clinic Note    Patient Name:  Khalida Leos is a 73 y.o. female     Chief Complaint:    Chief Complaint   Patient presents with    Follow-up     Patient is here for her checkup today.     Medication Problem     She is not taking Plavix. She doesn't know if she was supposed to be taking it or not. She only thought she was supposed to be taking three medicines.         Subjective  Follow-up  Pertinent negatives include no abdominal pain, chest pain, congestion, coughing, fatigue, fever, headaches, nausea, neck pain, rash, sore throat, vertigo or vomiting.            Current Outpatient Medications:     amLODIPine (NORVASC) 5 MG tablet, Take 1 tablet (5 mg total) by mouth once daily., Disp: 90 tablet, Rfl: 1    warfarin (COUMADIN) 5 MG tablet, Take 2 tablets by mouth on Monday and Friday, and 1 tablet by mouth all other days. (Patient taking differently: Take 2 tablets (10mg) by mouth on Monday and Friday, and 1.5 tablets (7.5mg) by mouth all other days.), Disp: 90 tablet, Rfl: 5    clopidogreL (PLAVIX) 75 mg tablet, Take 1 tablet (75 mg total) by mouth once daily., Disp: 30 tablet, Rfl: 11    furosemide (LASIX) 20 MG tablet, Take 1 tablet (20 mg total) by mouth once daily., Disp: 90 tablet, Rfl: 1    triamcinolone acetonide 0.1% (KENALOG) 0.1 % ointment, Apply topically 2 (two) times daily., Disp: 30 g, Rfl: 5   Past Medical History:   Diagnosis Date    History of mitral valve replacement     Hypertension       Past Surgical History:   Procedure Laterality Date    FLUOROSCOPIC ANGIOGRAPHY OF LOWER EXTREMITY WITH TOPICAL ULTRASOUND Left 7/7/2023    Procedure: ARTERIOGRAM-LEG AND ULTRASOUND;  Surgeon: Zara Zaldivar MD;  Location: [facility] OR;  Service: General;  Laterality: Left;    MITRAL VALVE REPLACEMENT      PTA, ARTERY, PERIPHERAL Left 7/7/2023    Procedure: PTA, ARTERY, PERIPHERAL;  Surgeon: Zara Zaldivar MD;  Location: [facility] OR;  Service: General;  Laterality: Left;  balloon      Family History  "  Problem Relation Age of Onset    Heart disease Mother     Cancer Father       Social History     Tobacco Use    Smoking status: Every Day     Types: Cigarettes     Passive exposure: Current    Smokeless tobacco: Never   Substance Use Topics    Alcohol use: Never        Review of Systems   Constitutional:  Negative for fatigue and fever.   HENT:  Negative for nasal congestion, rhinorrhea and sore throat.    Eyes:  Negative for visual disturbance.   Respiratory:  Negative for cough, chest tightness, shortness of breath and wheezing.    Cardiovascular:  Negative for chest pain, palpitations and leg swelling.   Gastrointestinal:  Negative for abdominal pain, blood in stool, nausea and vomiting.   Genitourinary:  Negative for dysuria and hematuria.   Musculoskeletal:  Negative for back pain and neck pain.   Integumentary:  Negative for rash and mole/lesion.   Neurological:  Negative for dizziness, vertigo, headaches and memory loss.   Hematological:  Negative for adenopathy.   Psychiatric/Behavioral:  Negative for confusion. The patient is not nervous/anxious.         Objective:  /84 (BP Location: Left arm, Patient Position: Sitting) Comment: manual  Pulse 90   Temp 97.9 °F (36.6 °C) (Oral)   Resp 16   Ht 5' 4" (1.626 m)   Wt 54.9 kg (121 lb)   SpO2 99%   BMI 20.77 kg/m²      Physical Exam  Constitutional:       Appearance: Normal appearance.   HENT:      Head: Normocephalic and atraumatic.      Right Ear: External ear normal.      Left Ear: External ear normal.      Nose: Nose normal.   Eyes:      Extraocular Movements: Extraocular movements intact.      Conjunctiva/sclera: Conjunctivae normal.      Pupils: Pupils are equal, round, and reactive to light.   Cardiovascular:      Rate and Rhythm: Normal rate. Rhythm irregular.      Pulses: Normal pulses.      Heart sounds: Normal heart sounds. No murmur heard.     No friction rub. No gallop.   Pulmonary:      Effort: Pulmonary effort is normal.      Breath " sounds: No wheezing, rhonchi or rales.   Abdominal:      General: Bowel sounds are normal.      Palpations: There is no mass.      Tenderness: There is no abdominal tenderness.   Musculoskeletal:         General: No swelling.      Cervical back: Normal range of motion and neck supple.   Skin:     General: Skin is warm.      Findings: No lesion or rash.   Neurological:      General: No focal deficit present.      Mental Status: She is alert and oriented to person, place, and time.   Psychiatric:         Mood and Affect: Mood normal.          Assessment and Plan    Essential hypertension, benign  -     Lipid Panel; Future; Expected date: 02/02/2024    Long term current use of anticoagulant  -     POCT PT/INR    PVD (peripheral vascular disease)  -     furosemide (LASIX) 20 MG tablet; Take 1 tablet (20 mg total) by mouth once daily.  Dispense: 90 tablet; Refill: 1    Longstanding persistent atrial fibrillation  -     CBC Auto Differential; Future; Expected date: 02/02/2024    Nonischemic congestive cardiomyopathy         Problem List Items Addressed This Visit          Cardiac/Vascular    Nonischemic congestive cardiomyopathy (Chronic)    Essential hypertension, benign - Primary (Chronic)    Relevant Orders    Lipid Panel    Longstanding persistent atrial fibrillation (Chronic)    Relevant Orders    CBC Auto Differential    PVD (peripheral vascular disease)    Relevant Medications    furosemide (LASIX) 20 MG tablet     Other Visit Diagnoses       Long term current use of anticoagulant        Relevant Orders    POCT PT/INR (Completed)           1-HTN controlled needs lipids  2-A-fib rate controlled, she is on coumadin  3-PVD-restart plavix  4-cardiomyopathy doing well, tabs Marquez  Follow up in about 3 months (around 5/2/2024).

## 2024-03-25 ENCOUNTER — OFFICE VISIT (OUTPATIENT)
Dept: FAMILY MEDICINE | Facility: CLINIC | Age: 74
End: 2024-03-25
Payer: MEDICARE

## 2024-03-25 VITALS
RESPIRATION RATE: 16 BRPM | WEIGHT: 122 LBS | DIASTOLIC BLOOD PRESSURE: 66 MMHG | BODY MASS INDEX: 20.83 KG/M2 | OXYGEN SATURATION: 100 % | SYSTOLIC BLOOD PRESSURE: 155 MMHG | HEART RATE: 87 BPM | HEIGHT: 64 IN | TEMPERATURE: 99 F

## 2024-03-25 DIAGNOSIS — Z79.01 LONG TERM CURRENT USE OF ANTICOAGULANT: ICD-10-CM

## 2024-03-25 DIAGNOSIS — I73.9 PVD (PERIPHERAL VASCULAR DISEASE): ICD-10-CM

## 2024-03-25 DIAGNOSIS — L03.116 CELLULITIS OF LEFT LEG WITHOUT FOOT: Primary | ICD-10-CM

## 2024-03-25 DIAGNOSIS — I48.11 LONGSTANDING PERSISTENT ATRIAL FIBRILLATION: Chronic | ICD-10-CM

## 2024-03-25 LAB
CTP QC/QA: YES
INR POC: 2.7 (ref 0–3.3)
PT, POC: 32 (ref 12–14.7)

## 2024-03-25 PROCEDURE — 85610 PROTHROMBIN TIME: CPT | Mod: RHCUB | Performed by: INTERNAL MEDICINE

## 2024-03-25 PROCEDURE — 99214 OFFICE O/P EST MOD 30 MIN: CPT | Mod: ,,, | Performed by: INTERNAL MEDICINE

## 2024-03-25 PROCEDURE — 96372 THER/PROPH/DIAG INJ SC/IM: CPT | Mod: ,,, | Performed by: INTERNAL MEDICINE

## 2024-03-25 RX ORDER — CLOPIDOGREL BISULFATE 75 MG/1
75 TABLET ORAL DAILY
Qty: 90 TABLET | Refills: 1 | Status: SHIPPED | OUTPATIENT
Start: 2024-03-25 | End: 2024-09-21

## 2024-03-25 RX ORDER — WARFARIN 10 MG/1
TABLET ORAL
Qty: 90 TABLET | Refills: 1 | Status: SHIPPED | OUTPATIENT
Start: 2024-03-25

## 2024-03-25 RX ORDER — CEFTRIAXONE 1 G/1
1 INJECTION, POWDER, FOR SOLUTION INTRAMUSCULAR; INTRAVENOUS
Status: COMPLETED | OUTPATIENT
Start: 2024-03-25 | End: 2024-03-25

## 2024-03-25 RX ORDER — CEPHALEXIN 500 MG/1
1000 CAPSULE ORAL EVERY 12 HOURS
Qty: 28 CAPSULE | Refills: 0 | Status: SHIPPED | OUTPATIENT
Start: 2024-03-25 | End: 2024-05-03

## 2024-03-25 RX ORDER — WARFARIN 10 MG/1
TABLET ORAL
COMMUNITY
End: 2024-03-25 | Stop reason: SDUPTHER

## 2024-03-25 RX ADMIN — CEFTRIAXONE 1 G: 1 INJECTION, POWDER, FOR SOLUTION INTRAMUSCULAR; INTRAVENOUS at 03:03

## 2024-03-25 NOTE — PROGRESS NOTES
New Clinic Note    Patient Name:  Khalida Leos is a 73 y.o. female     Chief Complaint:    Chief Complaint   Patient presents with    Leg Pain     Patient reports pain and redness in both lower legs, but she says the left leg is worse. She's also noticed clear drainage from her left leg. S/S 2-3 weeks.     Medication Problem     She has Plavix on her med list but she is not taking it. She states she doesn't have it at home and wasn't sure she should've been taking it. This was discussed at last visit, and encounter states to restart it. But she did not.         Subjective  Leg Pain              Current Outpatient Medications:     amLODIPine (NORVASC) 5 MG tablet, Take 1 tablet (5 mg total) by mouth once daily., Disp: 90 tablet, Rfl: 1    furosemide (LASIX) 20 MG tablet, Take 1 tablet (20 mg total) by mouth once daily., Disp: 90 tablet, Rfl: 1    cephALEXin (KEFLEX) 500 MG capsule, Take 2 capsules (1,000 mg total) by mouth every 12 (twelve) hours., Disp: 28 capsule, Rfl: 0    clopidogreL (PLAVIX) 75 mg tablet, Take 1 tablet (75 mg total) by mouth once daily., Disp: 90 tablet, Rfl: 1    triamcinolone acetonide 0.1% (KENALOG) 0.1 % ointment, Apply topically 2 (two) times daily., Disp: 30 g, Rfl: 5    warfarin (COUMADIN) 10 MG tablet, Take one tablet by mouth daily on Monday and Friday, and take one-half tablet by mouth daily on all other days., Disp: 90 tablet, Rfl: 1  No current facility-administered medications for this visit.   Past Medical History:   Diagnosis Date    History of mitral valve replacement     Hypertension       Past Surgical History:   Procedure Laterality Date    FLUOROSCOPIC ANGIOGRAPHY OF LOWER EXTREMITY WITH TOPICAL ULTRASOUND Left 7/7/2023    Procedure: ARTERIOGRAM-LEG AND ULTRASOUND;  Surgeon: Zara Zaldivar MD;  Location: [facility];  Service: General;  Laterality: Left;    MITRAL VALVE REPLACEMENT      PTA, ARTERY, PERIPHERAL Left 7/7/2023    Procedure: PTA, ARTERY, PERIPHERAL;  Surgeon:  "Zara Zaldivar MD;  Location: [facility] OR;  Service: General;  Laterality: Left;  balloon      Family History   Problem Relation Age of Onset    Heart disease Mother     Cancer Father       Social History     Tobacco Use    Smoking status: Every Day     Types: Cigarettes     Passive exposure: Current    Smokeless tobacco: Never   Substance Use Topics    Alcohol use: Never        Review of Systems   Constitutional:  Negative for fatigue and fever.   HENT:  Negative for nasal congestion, rhinorrhea and sore throat.    Eyes:  Negative for visual disturbance.   Respiratory:  Negative for cough, chest tightness, shortness of breath and wheezing.    Cardiovascular:  Negative for chest pain, palpitations and leg swelling.   Gastrointestinal:  Negative for abdominal pain, blood in stool, nausea and vomiting.   Genitourinary:  Negative for dysuria and hematuria.   Musculoskeletal:  Positive for leg pain. Negative for back pain and neck pain.   Integumentary:  Positive for wound. Negative for rash and mole/lesion.   Neurological:  Negative for dizziness, vertigo, headaches and memory loss.   Hematological:  Negative for adenopathy.   Psychiatric/Behavioral:  Negative for confusion. The patient is not nervous/anxious.         Objective:  BP (!) 155/66 (BP Location: Left arm, Patient Position: Sitting)   Pulse 87   Temp 98.5 °F (36.9 °C) (Oral)   Resp 16   Ht 5' 4" (1.626 m)   Wt 55.3 kg (122 lb)   SpO2 100%   BMI 20.94 kg/m²      Physical Exam  Constitutional:       Appearance: Normal appearance.   HENT:      Head: Normocephalic and atraumatic.      Nose: Nose normal.   Eyes:      Extraocular Movements: Extraocular movements intact.      Pupils: Pupils are equal, round, and reactive to light.   Cardiovascular:      Rate and Rhythm: Normal rate.      Pulses: Normal pulses.   Pulmonary:      Effort: Pulmonary effort is normal.   Musculoskeletal:      Cervical back: Normal range of motion.   Skin:     Comments: Redness " and dry crackled skin from mid shin to ankle on the left leg   Neurological:      Mental Status: She is alert and oriented to person, place, and time.          Assessment and Plan    Cellulitis of left leg without foot  -     cephALEXin (KEFLEX) 500 MG capsule; Take 2 capsules (1,000 mg total) by mouth every 12 (twelve) hours.  Dispense: 28 capsule; Refill: 0  -     cefTRIAXone injection 1 g    Long term current use of anticoagulant  -     POCT PT/INR    Longstanding persistent atrial fibrillation  -     warfarin (COUMADIN) 10 MG tablet; Take one tablet by mouth daily on Monday and Friday, and take one-half tablet by mouth daily on all other days.  Dispense: 90 tablet; Refill: 1    PVD (peripheral vascular disease)  -     clopidogreL (PLAVIX) 75 mg tablet; Take 1 tablet (75 mg total) by mouth once daily.  Dispense: 90 tablet; Refill: 1         Problem List Items Addressed This Visit          Cardiac/Vascular    Longstanding persistent atrial fibrillation (Chronic)    Relevant Medications    warfarin (COUMADIN) 10 MG tablet    PVD (peripheral vascular disease)    Relevant Medications    clopidogreL (PLAVIX) 75 mg tablet     Other Visit Diagnoses       Cellulitis of left leg without foot    -  Primary    Relevant Medications    cephALEXin (KEFLEX) 500 MG capsule    cefTRIAXone injection 1 g (Completed)    Long term current use of anticoagulant        Relevant Orders    POCT PT/INR (Completed)         Looks to be a recurrence of the celluitis and 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

## 2024-05-03 ENCOUNTER — OFFICE VISIT (OUTPATIENT)
Dept: FAMILY MEDICINE | Facility: CLINIC | Age: 74
End: 2024-05-03
Payer: MEDICARE

## 2024-05-03 VITALS
BODY MASS INDEX: 20.83 KG/M2 | DIASTOLIC BLOOD PRESSURE: 78 MMHG | RESPIRATION RATE: 16 BRPM | OXYGEN SATURATION: 95 % | SYSTOLIC BLOOD PRESSURE: 136 MMHG | TEMPERATURE: 98 F | HEART RATE: 77 BPM | HEIGHT: 64 IN | WEIGHT: 122 LBS

## 2024-05-03 DIAGNOSIS — L20.84 INTRINSIC ECZEMA: ICD-10-CM

## 2024-05-03 DIAGNOSIS — Z79.01 LONG TERM CURRENT USE OF ANTICOAGULANT: ICD-10-CM

## 2024-05-03 DIAGNOSIS — I10 ESSENTIAL HYPERTENSION, BENIGN: Primary | Chronic | ICD-10-CM

## 2024-05-03 LAB
ANION GAP SERPL CALCULATED.3IONS-SCNC: 7 MMOL/L (ref 7–16)
BUN SERPL-MCNC: 22 MG/DL (ref 7–18)
BUN/CREAT SERPL: 25 (ref 6–20)
CALCIUM SERPL-MCNC: 9.2 MG/DL (ref 8.5–10.1)
CHLORIDE SERPL-SCNC: 103 MMOL/L (ref 98–107)
CO2 SERPL-SCNC: 30 MMOL/L (ref 21–32)
CREAT SERPL-MCNC: 0.87 MG/DL (ref 0.55–1.02)
CTP QC/QA: YES
EGFR (NO RACE VARIABLE) (RUSH/TITUS): 70 ML/MIN/1.73M2
GLUCOSE SERPL-MCNC: 92 MG/DL (ref 74–106)
INR POC: 1.3 (ref 0–3.3)
POTASSIUM SERPL-SCNC: 4 MMOL/L (ref 3.5–5.1)
PT, POC: 16 (ref 12–14.7)
SODIUM SERPL-SCNC: 136 MMOL/L (ref 136–145)

## 2024-05-03 PROCEDURE — 80048 BASIC METABOLIC PNL TOTAL CA: CPT | Mod: ,,, | Performed by: CLINICAL MEDICAL LABORATORY

## 2024-05-03 PROCEDURE — 85610 PROTHROMBIN TIME: CPT | Mod: RHCUB | Performed by: INTERNAL MEDICINE

## 2024-05-03 PROCEDURE — 99214 OFFICE O/P EST MOD 30 MIN: CPT | Mod: ,,, | Performed by: INTERNAL MEDICINE

## 2024-05-03 RX ORDER — HYDROXYZINE HYDROCHLORIDE 25 MG/1
25 TABLET, FILM COATED ORAL 3 TIMES DAILY PRN
Qty: 30 TABLET | Refills: 5 | Status: SHIPPED | OUTPATIENT
Start: 2024-05-03

## 2024-05-03 RX ORDER — TRIAMCINOLONE ACETONIDE 1 MG/G
OINTMENT TOPICAL 2 TIMES DAILY
Qty: 30 G | Refills: 5 | Status: SHIPPED | OUTPATIENT
Start: 2024-05-03

## 2024-05-03 NOTE — PROGRESS NOTES
New Clinic Note    Patient Name:  Khalida Leos is a 73 y.o. female     Chief Complaint:    Chief Complaint   Patient presents with    Follow-up     Patient is here for her checkup today.         Subjective  Follow-up  Pertinent negatives include no abdominal pain, chest pain, congestion, coughing, fatigue, fever, headaches, nausea, neck pain, rash, sore throat, vertigo or vomiting.            Current Outpatient Medications:     amLODIPine (NORVASC) 5 MG tablet, Take 1 tablet (5 mg total) by mouth once daily., Disp: 90 tablet, Rfl: 1    clopidogreL (PLAVIX) 75 mg tablet, Take 1 tablet (75 mg total) by mouth once daily., Disp: 90 tablet, Rfl: 1    furosemide (LASIX) 20 MG tablet, Take 1 tablet (20 mg total) by mouth once daily., Disp: 90 tablet, Rfl: 1    warfarin (COUMADIN) 10 MG tablet, Take one tablet by mouth daily on Monday and Friday, and take one-half tablet by mouth daily on all other days., Disp: 90 tablet, Rfl: 1    hydrOXYzine HCL (ATARAX) 25 MG tablet, Take 1 tablet (25 mg total) by mouth 3 (three) times daily as needed for Itching., Disp: 30 tablet, Rfl: 5    triamcinolone acetonide 0.1% (KENALOG) 0.1 % ointment, Apply topically 2 (two) times daily., Disp: 30 g, Rfl: 5   Past Medical History:   Diagnosis Date    History of mitral valve replacement     Hypertension       Past Surgical History:   Procedure Laterality Date    FLUOROSCOPIC ANGIOGRAPHY OF LOWER EXTREMITY WITH TOPICAL ULTRASOUND Left 7/7/2023    Procedure: ARTERIOGRAM-LEG AND ULTRASOUND;  Surgeon: Zara Zaldivar MD;  Location: [facility] OR;  Service: General;  Laterality: Left;    MITRAL VALVE REPLACEMENT      PTA, ARTERY, PERIPHERAL Left 7/7/2023    Procedure: PTA, ARTERY, PERIPHERAL;  Surgeon: Zara Zaldivar MD;  Location: [facility] OR;  Service: General;  Laterality: Left;  balloon      Family History   Problem Relation Name Age of Onset    Heart disease Mother      Cancer Father        Social History     Tobacco Use    Smoking status:  "Every Day     Types: Cigarettes     Passive exposure: Current    Smokeless tobacco: Never   Substance Use Topics    Alcohol use: Never        Review of Systems   Constitutional:  Negative for fatigue and fever.   HENT:  Negative for nasal congestion, rhinorrhea and sore throat.    Eyes:  Negative for visual disturbance.   Respiratory:  Negative for cough, chest tightness, shortness of breath and wheezing.    Cardiovascular:  Positive for leg swelling. Negative for chest pain and palpitations.   Gastrointestinal:  Negative for abdominal pain, blood in stool, nausea and vomiting.   Genitourinary:  Negative for dysuria and hematuria.   Musculoskeletal:  Negative for back pain and neck pain.   Integumentary:  Negative for rash and mole/lesion.   Neurological:  Negative for dizziness, vertigo, headaches and memory loss.   Hematological:  Negative for adenopathy.   Psychiatric/Behavioral:  Negative for confusion. The patient is not nervous/anxious.         Objective:  /78 (BP Location: Left arm, Patient Position: Sitting)   Pulse 77   Temp 97.7 °F (36.5 °C) (Oral)   Resp 16   Ht 5' 4" (1.626 m)   Wt 55.3 kg (122 lb)   SpO2 95%   BMI 20.94 kg/m²      Physical Exam  Constitutional:       Appearance: Normal appearance.   HENT:      Head: Normocephalic and atraumatic.      Right Ear: External ear normal.      Left Ear: External ear normal.      Nose: Nose normal.   Eyes:      Extraocular Movements: Extraocular movements intact.      Conjunctiva/sclera: Conjunctivae normal.      Pupils: Pupils are equal, round, and reactive to light.   Cardiovascular:      Rate and Rhythm: Normal rate. Rhythm irregular.      Pulses: Normal pulses.      Heart sounds: Normal heart sounds. No murmur heard.     No friction rub. No gallop.   Pulmonary:      Effort: Pulmonary effort is normal.      Breath sounds: No wheezing, rhonchi or rales.   Abdominal:      General: Bowel sounds are normal.      Palpations: There is no mass.      " Tenderness: There is no abdominal tenderness.   Musculoskeletal:         General: No swelling.      Cervical back: Normal range of motion and neck supple.      Left lower leg: Edema present.   Skin:     General: Skin is warm.      Findings: No lesion or rash.   Neurological:      General: No focal deficit present.      Mental Status: She is alert and oriented to person, place, and time.   Psychiatric:         Mood and Affect: Mood normal.          Assessment and Plan    Essential hypertension, benign  -     Basic Metabolic Panel; Future; Expected date: 05/03/2024    Long term current use of anticoagulant  -     POCT PT/INR    Intrinsic eczema  -     hydrOXYzine HCL (ATARAX) 25 MG tablet; Take 1 tablet (25 mg total) by mouth 3 (three) times daily as needed for Itching.  Dispense: 30 tablet; Refill: 5  -     triamcinolone acetonide 0.1% (KENALOG) 0.1 % ointment; Apply topically 2 (two) times daily.  Dispense: 30 g; Refill: 5         Problem List Items Addressed This Visit          Cardiac/Vascular    Essential hypertension, benign - Primary (Chronic)    Relevant Orders    Basic Metabolic Panel     Other Visit Diagnoses       Long term current use of anticoagulant        Relevant Orders    POCT PT/INR (Completed)    Intrinsic eczema        Relevant Medications    hydrOXYzine HCL (ATARAX) 25 MG tablet    triamcinolone acetonide 0.1% (KENALOG) 0.1 % ointment         1-HTN controlled  2-A-fib on coumadin-recheck INR 1 month avoid excess greens  3-atarax for itching of leg, no skin break down, wear compression hose for edema.  Defers MMG/DEXA    Follow up in about 3 months (around 8/3/2024).

## 2024-06-09 DIAGNOSIS — Z71.89 COMPLEX CARE COORDINATION: ICD-10-CM

## 2024-08-20 DIAGNOSIS — I10 ESSENTIAL HYPERTENSION, BENIGN: Chronic | ICD-10-CM

## 2024-08-20 RX ORDER — AMLODIPINE BESYLATE 5 MG/1
5 TABLET ORAL
Qty: 90 TABLET | Refills: 0 | Status: SHIPPED | OUTPATIENT
Start: 2024-08-20

## 2024-10-06 DIAGNOSIS — I73.9 PVD (PERIPHERAL VASCULAR DISEASE): ICD-10-CM

## 2024-10-07 RX ORDER — CLOPIDOGREL BISULFATE 75 MG/1
75 TABLET ORAL
Qty: 90 TABLET | Refills: 0 | Status: SHIPPED | OUTPATIENT
Start: 2024-10-07

## 2024-10-10 ENCOUNTER — OFFICE VISIT (OUTPATIENT)
Dept: FAMILY MEDICINE | Facility: CLINIC | Age: 74
End: 2024-10-10
Payer: MEDICARE

## 2024-10-10 VITALS
TEMPERATURE: 98 F | RESPIRATION RATE: 16 BRPM | HEIGHT: 64 IN | HEART RATE: 75 BPM | SYSTOLIC BLOOD PRESSURE: 134 MMHG | BODY MASS INDEX: 21.17 KG/M2 | OXYGEN SATURATION: 99 % | WEIGHT: 124 LBS | DIASTOLIC BLOOD PRESSURE: 74 MMHG

## 2024-10-10 DIAGNOSIS — I48.11 LONGSTANDING PERSISTENT ATRIAL FIBRILLATION: Chronic | ICD-10-CM

## 2024-10-10 DIAGNOSIS — M79.672 LEFT FOOT PAIN: ICD-10-CM

## 2024-10-10 DIAGNOSIS — Z79.01 LONG TERM CURRENT USE OF ANTICOAGULANT: ICD-10-CM

## 2024-10-10 DIAGNOSIS — I10 ESSENTIAL HYPERTENSION, BENIGN: Primary | Chronic | ICD-10-CM

## 2024-10-10 LAB
CTP QC/QA: YES
INR POC: 1.7 (ref 0–3.3)
PT, POC: 21 (ref 12–14.7)

## 2024-10-10 PROCEDURE — 99214 OFFICE O/P EST MOD 30 MIN: CPT | Mod: ,,, | Performed by: INTERNAL MEDICINE

## 2024-10-10 PROCEDURE — 85610 PROTHROMBIN TIME: CPT | Mod: RHCUB | Performed by: INTERNAL MEDICINE

## 2024-10-10 RX ORDER — GABAPENTIN 100 MG/1
100 CAPSULE ORAL 2 TIMES DAILY
Qty: 180 CAPSULE | Refills: 1 | Status: SHIPPED | OUTPATIENT
Start: 2024-10-10 | End: 2025-10-10

## 2024-10-10 RX ORDER — WARFARIN 10 MG/1
TABLET ORAL
Qty: 90 TABLET | Refills: 1 | Status: SHIPPED | OUTPATIENT
Start: 2024-10-10

## 2024-10-10 NOTE — PROGRESS NOTES
New Clinic Note    Patient Name:  Khalida Leos is a 74 y.o. female     Chief Complaint:    Chief Complaint   Patient presents with    Hypertension     Patient is here for her checkup today.     Health Maintenance     Declines all care gaps    Leg Pain     She has pain to her left lower leg.     did not bring meds        Subjective  Hypertension  Pertinent negatives include no chest pain, headaches, neck pain, palpitations or shortness of breath.   Leg Pain              Current Outpatient Medications:     amLODIPine (NORVASC) 5 MG tablet, Take 1 tablet by mouth once daily., Disp: 90 tablet, Rfl: 0    clopidogreL (PLAVIX) 75 mg tablet, take one Tablet by mouth once daily, Disp: 90 tablet, Rfl: 0    furosemide (LASIX) 20 MG tablet, Take 1 tablet (20 mg total) by mouth once daily., Disp: 90 tablet, Rfl: 1    gabapentin (NEURONTIN) 100 MG capsule, Take 1 capsule (100 mg total) by mouth 2 (two) times daily., Disp: 180 capsule, Rfl: 1    hydrOXYzine HCL (ATARAX) 25 MG tablet, Take 1 tablet (25 mg total) by mouth 3 (three) times daily as needed for Itching., Disp: 30 tablet, Rfl: 5    triamcinolone acetonide 0.1% (KENALOG) 0.1 % ointment, Apply topically 2 (two) times daily., Disp: 30 g, Rfl: 5    warfarin (COUMADIN) 10 MG tablet, Take one tablet by mouth daily on Monday and Friday, and take one-half tablet by mouth daily on all other days., Disp: 90 tablet, Rfl: 1   Past Medical History:   Diagnosis Date    History of mitral valve replacement     Hypertension       Past Surgical History:   Procedure Laterality Date    FLUOROSCOPIC ANGIOGRAPHY OF LOWER EXTREMITY WITH TOPICAL ULTRASOUND Left 7/7/2023    Procedure: ARTERIOGRAM-LEG AND ULTRASOUND;  Surgeon: Zara Zaldivar MD;  Location: [facility] OR;  Service: General;  Laterality: Left;    MITRAL VALVE REPLACEMENT      PTA, ARTERY, PERIPHERAL Left 7/7/2023    Procedure: PTA, ARTERY, PERIPHERAL;  Surgeon: Zara Zaldivar MD;  Location: [facility] OR;  Service: General;   "Laterality: Left;  balloon      Family History   Problem Relation Name Age of Onset    Heart disease Mother      Cancer Father        Social History     Tobacco Use    Smoking status: Every Day     Types: Cigarettes     Passive exposure: Current    Smokeless tobacco: Never   Substance Use Topics    Alcohol use: Never        Review of Systems   Constitutional:  Negative for fatigue and fever.   HENT:  Negative for nasal congestion, rhinorrhea and sore throat.    Eyes:  Negative for visual disturbance.   Respiratory:  Negative for cough, chest tightness, shortness of breath and wheezing.    Cardiovascular:  Negative for chest pain, palpitations and leg swelling.   Gastrointestinal:  Negative for abdominal pain, blood in stool, nausea and vomiting.   Genitourinary:  Negative for dysuria and hematuria.   Musculoskeletal:  Positive for leg pain. Negative for back pain and neck pain.   Integumentary:  Negative for rash and mole/lesion.   Neurological:  Negative for dizziness, vertigo, headaches and memory loss.   Hematological:  Negative for adenopathy.   Psychiatric/Behavioral:  Negative for confusion. The patient is not nervous/anxious.         Objective:  /74   Pulse 75   Temp 98 °F (36.7 °C) (Oral)   Resp 16   Ht 5' 4" (1.626 m)   Wt 56.2 kg (124 lb)   SpO2 99%   BMI 21.28 kg/m²      Physical Exam  Constitutional:       Appearance: Normal appearance.   HENT:      Head: Normocephalic and atraumatic.      Right Ear: External ear normal.      Left Ear: External ear normal.      Nose: Nose normal.   Eyes:      Extraocular Movements: Extraocular movements intact.      Conjunctiva/sclera: Conjunctivae normal.      Pupils: Pupils are equal, round, and reactive to light.   Cardiovascular:      Rate and Rhythm: Normal rate and regular rhythm.      Pulses: Normal pulses.      Heart sounds: Normal heart sounds. No murmur heard.     No friction rub. No gallop.   Pulmonary:      Effort: Pulmonary effort is normal.     "  Breath sounds: No wheezing, rhonchi or rales.   Abdominal:      General: Bowel sounds are normal.      Palpations: There is no mass.      Tenderness: There is no abdominal tenderness.   Musculoskeletal:         General: No swelling.      Cervical back: Normal range of motion and neck supple.   Skin:     General: Skin is warm.      Findings: No lesion or rash.   Neurological:      General: No focal deficit present.      Mental Status: She is alert and oriented to person, place, and time.   Psychiatric:         Mood and Affect: Mood normal.          Assessment and Plan    Essential hypertension, benign    Long term current use of anticoagulant  -     POCT PT/INR    Longstanding persistent atrial fibrillation  -     warfarin (COUMADIN) 10 MG tablet; Take one tablet by mouth daily on Monday and Friday, and take one-half tablet by mouth daily on all other days.  Dispense: 90 tablet; Refill: 1    Left foot pain  -     gabapentin (NEURONTIN) 100 MG capsule; Take 1 capsule (100 mg total) by mouth 2 (two) times daily.  Dispense: 180 capsule; Refill: 1         Problem List Items Addressed This Visit          Cardiac/Vascular    Essential hypertension, benign - Primary (Chronic)    Longstanding persistent atrial fibrillation (Chronic)    Relevant Medications    warfarin (COUMADIN) 10 MG tablet     Other Visit Diagnoses       Long term current use of anticoagulant        Relevant Orders    POCT PT/INR (Completed)    Left foot pain        Relevant Medications    gabapentin (NEURONTIN) 100 MG capsule         1-HTN controlled  2-recheck INR in 1 month for A-fib  3-foot pain-try neurontin 100mg bid    Follow up in about 3 months (around 1/10/2025).

## 2024-12-23 DIAGNOSIS — I10 ESSENTIAL HYPERTENSION, BENIGN: Chronic | ICD-10-CM

## 2024-12-23 RX ORDER — AMLODIPINE BESYLATE 5 MG/1
5 TABLET ORAL
Qty: 90 TABLET | Refills: 0 | Status: SHIPPED | OUTPATIENT
Start: 2024-12-23

## 2025-01-16 ENCOUNTER — OFFICE VISIT (OUTPATIENT)
Dept: FAMILY MEDICINE | Facility: CLINIC | Age: 75
End: 2025-01-16
Payer: MEDICARE

## 2025-01-16 VITALS
SYSTOLIC BLOOD PRESSURE: 138 MMHG | WEIGHT: 125 LBS | BODY MASS INDEX: 21.34 KG/M2 | DIASTOLIC BLOOD PRESSURE: 74 MMHG | HEART RATE: 93 BPM | OXYGEN SATURATION: 100 % | HEIGHT: 64 IN | TEMPERATURE: 97 F | RESPIRATION RATE: 16 BRPM

## 2025-01-16 DIAGNOSIS — H34.8312 TRIBUTARY (BRANCH) RETINAL VEIN OCCLUSION, RIGHT EYE, STABLE: ICD-10-CM

## 2025-01-16 DIAGNOSIS — Z79.01 LONG TERM CURRENT USE OF ANTICOAGULANT: ICD-10-CM

## 2025-01-16 DIAGNOSIS — I10 ESSENTIAL HYPERTENSION, BENIGN: Primary | Chronic | ICD-10-CM

## 2025-01-16 DIAGNOSIS — G89.4 CHRONIC PAIN SYNDROME: ICD-10-CM

## 2025-01-16 DIAGNOSIS — I48.11 LONGSTANDING PERSISTENT ATRIAL FIBRILLATION: Chronic | ICD-10-CM

## 2025-01-16 DIAGNOSIS — I42.0 NONISCHEMIC CONGESTIVE CARDIOMYOPATHY: Chronic | ICD-10-CM

## 2025-01-16 DIAGNOSIS — L20.84 INTRINSIC ECZEMA: ICD-10-CM

## 2025-01-16 PROBLEM — J43.1 PANLOBULAR EMPHYSEMA: Status: RESOLVED | Noted: 2024-01-08 | Resolved: 2025-01-16

## 2025-01-16 PROBLEM — I27.20 PULMONARY HYPERTENSION: Status: RESOLVED | Noted: 2024-01-08 | Resolved: 2025-01-16

## 2025-01-16 LAB
CTP QC/QA: YES
INR POC: 2.9 (ref 0–3.3)
PT, POC: 34.4 (ref 12–14.7)

## 2025-01-16 PROCEDURE — 99214 OFFICE O/P EST MOD 30 MIN: CPT | Mod: ,,, | Performed by: INTERNAL MEDICINE

## 2025-01-16 PROCEDURE — 85610 PROTHROMBIN TIME: CPT | Mod: RHCUB | Performed by: INTERNAL MEDICINE

## 2025-01-16 RX ORDER — TRIAMCINOLONE ACETONIDE 1 MG/G
OINTMENT TOPICAL 2 TIMES DAILY
Qty: 30 G | Refills: 5 | Status: SHIPPED | OUTPATIENT
Start: 2025-01-16

## 2025-01-16 RX ORDER — GABAPENTIN 300 MG/1
300 CAPSULE ORAL 2 TIMES DAILY
Qty: 180 CAPSULE | Refills: 1 | Status: SHIPPED | OUTPATIENT
Start: 2025-01-16 | End: 2026-01-16

## 2025-01-16 NOTE — PROGRESS NOTES
New Clinic Note    Patient Name:  Khalida Loes is a 74 y.o. female     Chief Complaint:    Chief Complaint   Patient presents with    Hypertension     Patient is here for her checkup today.     Foot Pain     She reports pain in both of her feet. She has some calluses to her feet. She is only taking her Gabapentin daily. She states sometimes her feet itch, also. She has Hydroxyzine to take PRN, but she states it does not help.         Subjective  Hypertension  Pertinent negatives include no chest pain, headaches, neck pain, palpitations or shortness of breath.   Foot Pain  Associated symptoms include a rash. Pertinent negatives include no abdominal pain, chest pain, congestion, coughing, fatigue, fever, headaches, nausea, neck pain, sore throat, vertigo or vomiting.            Current Outpatient Medications:     amLODIPine (NORVASC) 5 MG tablet, take one Tablet by mouth once daily, Disp: 90 tablet, Rfl: 0    clopidogreL (PLAVIX) 75 mg tablet, take one Tablet by mouth once daily, Disp: 90 tablet, Rfl: 0    furosemide (LASIX) 20 MG tablet, Take 1 tablet (20 mg total) by mouth once daily., Disp: 90 tablet, Rfl: 1    warfarin (COUMADIN) 10 MG tablet, Take one tablet by mouth daily on Monday and Friday, and take one-half tablet by mouth daily on all other days., Disp: 90 tablet, Rfl: 1    gabapentin (NEURONTIN) 300 MG capsule, Take 1 capsule (300 mg total) by mouth 2 (two) times daily., Disp: 180 capsule, Rfl: 1    hydrOXYzine HCL (ATARAX) 25 MG tablet, Take 1 tablet (25 mg total) by mouth 3 (three) times daily as needed for Itching., Disp: 30 tablet, Rfl: 5    triamcinolone acetonide 0.1% (KENALOG) 0.1 % ointment, Apply topically 2 (two) times daily., Disp: 30 g, Rfl: 5   Past Medical History:   Diagnosis Date    History of mitral valve replacement     Hypertension       Past Surgical History:   Procedure Laterality Date    FLUOROSCOPIC ANGIOGRAPHY OF LOWER EXTREMITY WITH TOPICAL ULTRASOUND Left 7/7/2023    Procedure:  "ARTERIOGRAM-LEG AND ULTRASOUND;  Surgeon: Zara Zaldivar MD;  Location: [facility] OR;  Service: General;  Laterality: Left;    MITRAL VALVE REPLACEMENT      PTA, ARTERY, PERIPHERAL Left 7/7/2023    Procedure: PTA, ARTERY, PERIPHERAL;  Surgeon: Zara Zaldivar MD;  Location: [facility] OR;  Service: General;  Laterality: Left;  balloon      Family History   Problem Relation Name Age of Onset    Heart disease Mother      Cancer Father        Social History     Tobacco Use    Smoking status: Every Day     Types: Cigarettes     Passive exposure: Current    Smokeless tobacco: Never   Substance Use Topics    Alcohol use: Never        Review of Systems   Constitutional:  Negative for fatigue and fever.   HENT:  Negative for nasal congestion, rhinorrhea and sore throat.    Eyes:  Negative for visual disturbance.   Respiratory:  Negative for cough, chest tightness, shortness of breath and wheezing.    Cardiovascular:  Negative for chest pain, palpitations and leg swelling.   Gastrointestinal:  Negative for abdominal pain, blood in stool, nausea and vomiting.   Genitourinary:  Negative for dysuria and hematuria.   Musculoskeletal:  Positive for leg pain. Negative for back pain and neck pain.   Integumentary:  Positive for rash. Negative for mole/lesion.   Neurological:  Negative for dizziness, vertigo, headaches and memory loss.   Hematological:  Negative for adenopathy.   Psychiatric/Behavioral:  Negative for confusion. The patient is not nervous/anxious.         Objective:  /74 (BP Location: Left arm, Patient Position: Sitting)   Pulse 93   Temp 97 °F (36.1 °C) (Oral)   Resp 16   Ht 5' 4" (1.626 m)   Wt 56.7 kg (125 lb)   SpO2 100%   BMI 21.46 kg/m²      Physical Exam  Constitutional:       Appearance: Normal appearance.   HENT:      Head: Normocephalic and atraumatic.      Right Ear: External ear normal.      Left Ear: External ear normal.      Nose: Nose normal.   Eyes:      Extraocular Movements: Extraocular " movements intact.      Conjunctiva/sclera: Conjunctivae normal.      Pupils: Pupils are equal, round, and reactive to light.   Cardiovascular:      Rate and Rhythm: Normal rate. Rhythm irregular.      Pulses: Normal pulses.      Heart sounds: Murmur (2/6 MELINDA) heard.      No friction rub. No gallop.   Pulmonary:      Effort: Pulmonary effort is normal.      Breath sounds: No wheezing, rhonchi or rales.   Abdominal:      General: Bowel sounds are normal.      Palpations: There is no mass.      Tenderness: There is no abdominal tenderness.   Musculoskeletal:         General: No swelling.      Cervical back: Normal range of motion and neck supple.   Skin:     General: Skin is warm.      Findings: No lesion or rash.   Neurological:      General: No focal deficit present.      Mental Status: She is alert and oriented to person, place, and time.   Psychiatric:         Mood and Affect: Mood normal.          Assessment and Plan    Essential hypertension, benign  -     CBC Auto Differential; Future; Expected date: 01/16/2025  -     Basic Metabolic Panel; Future; Expected date: 01/16/2025  -     Lipid Panel; Future; Expected date: 01/16/2025    Long term current use of anticoagulant  -     POCT PT/INR    Nonischemic congestive cardiomyopathy    Longstanding persistent atrial fibrillation    Chronic pain syndrome  -     gabapentin (NEURONTIN) 300 MG capsule; Take 1 capsule (300 mg total) by mouth 2 (two) times daily.  Dispense: 180 capsule; Refill: 1    Intrinsic eczema  -     triamcinolone acetonide 0.1% (KENALOG) 0.1 % ointment; Apply topically 2 (two) times daily.  Dispense: 30 g; Refill: 5    Tributary (branch) retinal vein occlusion, right eye, stable         Problem List Items Addressed This Visit          Ophtho    Tributary (branch) retinal vein occlusion, right eye, stable       Cardiac/Vascular    Nonischemic congestive cardiomyopathy (Chronic)    Essential hypertension, benign - Primary (Chronic)    Relevant Orders     CBC Auto Differential    Basic Metabolic Panel    Lipid Panel    Longstanding persistent atrial fibrillation (Chronic)     Other Visit Diagnoses       Long term current use of anticoagulant        Relevant Orders    POCT PT/INR (Completed)    Chronic pain syndrome        Relevant Medications    gabapentin (NEURONTIN) 300 MG capsule    Intrinsic eczema        Relevant Medications    triamcinolone acetonide 0.1% (KENALOG) 0.1 % ointment         1-HTN controlled  3-X-tgn-rate controlled, INR monthly, on coumadin  3-Chronic leg pain-increase neurontin to 300mg bid, consider Lyrica  4-Eczema-refill TCM 0.1%bid  5-Non ischemic cardiomyopathy-may need to see cardiology at some point this year.    Follow up in about 6 months (around 7/16/2025).

## 2025-03-16 DIAGNOSIS — I73.9 PVD (PERIPHERAL VASCULAR DISEASE): ICD-10-CM

## 2025-03-17 RX ORDER — FUROSEMIDE 20 MG/1
20 TABLET ORAL
Qty: 90 TABLET | Refills: 1 | Status: SHIPPED | OUTPATIENT
Start: 2025-03-17

## 2025-03-24 DIAGNOSIS — I10 ESSENTIAL HYPERTENSION, BENIGN: Chronic | ICD-10-CM

## 2025-03-24 RX ORDER — AMLODIPINE BESYLATE 5 MG/1
5 TABLET ORAL
Qty: 90 TABLET | Refills: 0 | Status: SHIPPED | OUTPATIENT
Start: 2025-03-24

## 2025-04-08 DIAGNOSIS — I73.9 PVD (PERIPHERAL VASCULAR DISEASE): ICD-10-CM

## 2025-04-08 RX ORDER — CLOPIDOGREL BISULFATE 75 MG/1
75 TABLET ORAL
Qty: 90 TABLET | Refills: 1 | Status: SHIPPED | OUTPATIENT
Start: 2025-04-08

## 2025-08-29 ENCOUNTER — PATIENT MESSAGE (OUTPATIENT)
Facility: HOSPITAL | Age: 75
End: 2025-08-29
Payer: MEDICARE

## (undated) DEVICE — GLOVE PROTEXIS PI SYN SURG 6.5

## (undated) DEVICE — SHEATH BRITE TIP INTRO 11CM 9F

## (undated) DEVICE — Device

## (undated) DEVICE — GLOVE 6.5 PROTEXIS PI BLUE

## (undated) DEVICE — SOL NACL IRR 1000ML BTL

## (undated) DEVICE — CATH SUPPORT QUICK-CROSS .018

## (undated) DEVICE — GOWN NONREINF SET-IN SLV 2XL

## (undated) DEVICE — SHEATH DESTINATION 6FR 45CM

## (undated) DEVICE — COVER PROBE WITH BAND 6X96IN

## (undated) DEVICE — GUIDEWIRE COMMAND .014X300CM

## (undated) DEVICE — SHEATH BRITE TIP INTRO 11CM 5F

## (undated) DEVICE — CATHETER RENAL BIFURCATION 5FR X 65CM RBI

## (undated) DEVICE — GUIDEWIRE AQUATRACK REG 260CM

## (undated) DEVICE — COVER SNAP KAP 26IN

## (undated) DEVICE — GLIDEWIRE AQUATRACK 260CM STIFF ANGLED

## (undated) DEVICE — DEVICE INFLATION BASIX 25

## (undated) DEVICE — TAPE GLOW'N TELL 55CM STL BX/20

## (undated) DEVICE — GLOVE PROTEXIS PI SYN SURG 7.5